# Patient Record
Sex: MALE | Race: WHITE | Employment: OTHER | URBAN - METROPOLITAN AREA
[De-identification: names, ages, dates, MRNs, and addresses within clinical notes are randomized per-mention and may not be internally consistent; named-entity substitution may affect disease eponyms.]

---

## 2017-01-31 ENCOUNTER — HOSPITAL ENCOUNTER (OUTPATIENT)
Dept: LAB | Age: 72
Discharge: HOME | End: 2017-01-31
Payer: MEDICARE

## 2017-01-31 PROCEDURE — 80053 COMPREHEN METABOLIC PANEL: CPT

## 2017-01-31 PROCEDURE — 36415 COLL VENOUS BLD VENIPUNCTURE: CPT

## 2017-01-31 PROCEDURE — 80061 LIPID PANEL: CPT

## 2017-02-07 ENCOUNTER — OFFICE VISIT (OUTPATIENT)
Dept: CARDIOLOGY CLINIC | Age: 72
End: 2017-02-07

## 2017-02-07 VITALS
HEART RATE: 72 BPM | OXYGEN SATURATION: 98 % | RESPIRATION RATE: 16 BRPM | BODY MASS INDEX: 25.51 KG/M2 | WEIGHT: 178.2 LBS | SYSTOLIC BLOOD PRESSURE: 110 MMHG | HEIGHT: 70 IN | DIASTOLIC BLOOD PRESSURE: 64 MMHG

## 2017-02-07 DIAGNOSIS — Z95.1 S/P CABG X 3: ICD-10-CM

## 2017-02-07 DIAGNOSIS — I10 ESSENTIAL HYPERTENSION: ICD-10-CM

## 2017-02-07 DIAGNOSIS — E78.2 MIXED HYPERLIPIDEMIA: ICD-10-CM

## 2017-02-07 DIAGNOSIS — I25.10 CORONARY ARTERY DISEASE INVOLVING NATIVE CORONARY ARTERY OF NATIVE HEART WITHOUT ANGINA PECTORIS: Primary | ICD-10-CM

## 2017-02-07 RX ORDER — TAMSULOSIN HYDROCHLORIDE 0.4 MG/1
0.4 CAPSULE ORAL DAILY
COMMUNITY
Start: 2017-01-12 | End: 2018-02-16

## 2017-02-07 NOTE — LETTER
7/28/2017 8:47 AM 
 
Mr. Sharon Dumont [address] [facility] [address] Dear Sharon Dumont: 
 
Please find your most recent results below. Resulted Orders LIPID PANEL Result Value Ref Range Cholesterol, total 105 100 - 199 mg/dL Triglyceride 73 0 - 149 mg/dL HDL Cholesterol 32 (L) >39 mg/dL VLDL, calculated 15 5 - 40 mg/dL LDL, calculated 58 0 - 99 mg/dL Narrative Performed at:  [address]  [id number] : Denice Springer MD, Phone:  [phone] METABOLIC PANEL, COMPREHENSIVE Result Value Ref Range Glucose 87 65 - 99 mg/dL BUN 23 8 - 27 mg/dL Creatinine 1.11 0.76 - 1.27 mg/dL GFR est non-AA 67 >59 mL/min/1.73 GFR est AA 78 >59 mL/min/1.73  
 BUN/Creatinine ratio 21 10 - 24 Sodium 144 134 - 144 mmol/L Potassium 4.6 3.5 - 5.2 mmol/L Chloride 103 96 - 106 mmol/L  
 CO2 24 18 - 29 mmol/L Calcium 9.3 8.6 - 10.2 mg/dL Protein, total 6.3 6.0 - 8.5 g/dL Albumin 4.1 3.6 - 4.8 g/dL GLOBULIN, TOTAL 2.2 1.5 - 4.5 g/dL A-G Ratio 1.9 1.2 - 2.2 Bilirubin, total 0.9 0.0 - 1.2 mg/dL Alk. phosphatase 60 39 - 117 IU/L  
 AST (SGOT) 15 0 - 40 IU/L  
 ALT (SGPT) 15 0 - 44 IU/L Narrative Performed at:  [address]  [id number] : Denice Springer MD, Phone:  [phone] CVD REPORT Result Value Ref Range INTERPRETATION Note Comment:  
   Supplement report is available. Narrative Performed at:  [address] [place] [address]  [id number] : Nella New PhD, Phone:  [phone] RECOMMENDATIONS: Labs look good. Stay on medications and repeat in 6 months Please call me if you have any questions: [phone] Sincerely, MD Robert Ramos, RN

## 2017-02-07 NOTE — PROGRESS NOTES
HISTORY OF PRESENT ILLNESS  Matteo Galicia is a 73 y.o. male     SUMMARY:   Problem List  Date Reviewed: 2/7/2017          Codes Class Noted    Postoperative anemia due to acute blood loss ICD-10-CM: D62  ICD-9-CM: 285.1  9/16/2016        S/P CABG x 3 ICD-10-CM: Z95.1  ICD-9-CM: V45.81  9/15/2016    Overview Signed 9/15/2016 11:20 AM by KATHYA Granados     CORONARY ARTERY BYPASS GRAFT x3, LIMA to LAD. RSVG to Dx. RSVG to PDA  Right endoscopic greater saphenous vein harvest               CAD (coronary artery disease) ICD-10-CM: I25.10  ICD-9-CM: 414.00  9/13/2016    Overview Signed 11/8/2016  7:37 AM by Candace Ortiz MD     9/16 stress echo positive with findings consistent with left main or multivessel disease             SOB (shortness of breath) ICD-10-CM: R06.02  ICD-9-CM: 786.05  8/31/2016        Precordial pain ICD-10-CM: R07.2  ICD-9-CM: 786.51  8/31/2016    Overview Signed 8/31/2016  4:43 PM by Jonathan Read MD     11/02 [facility], stress echo, ?distal lateral wall infarct with improved wall motion following exercise             HTN (hypertension) ICD-10-CM: I10  ICD-9-CM: 401.9  8/31/2016        Mixed hyperlipidemia ICD-10-CM: E78.2  ICD-9-CM: 272.2  8/31/2016              Current Outpatient Prescriptions on File Prior to Visit   Medication Sig    metoprolol succinate (TOPROL XL) 100 mg tablet Take 1 Tab by mouth daily.  psyllium (METAMUCIL) powd Take 3 Tabs by mouth daily.  oxyCODONE-acetaminophen (PERCOCET) 5-325 mg per tablet Take 1 Tab by mouth every six (6) hours as needed for Pain. Max Daily Amount: 4 Tabs.  nitroglycerin (NITROSTAT) 0.4 mg SL tablet 1 Tab by SubLINGual route every five (5) minutes as needed for Chest Pain (Max of 3 doses then call 911).  fluticasone (FLONASE) 50 mcg/actuation nasal spray 2 Sprays by Both Nostrils route as needed.  allopurinol (ZYLOPRIM) 300 mg tablet Take  by mouth daily.     fenofibrate nanocrystallized (TRICOR) 145 mg tablet Take  by mouth daily.  atorvastatin (LIPITOR) 20 mg tablet Take  by mouth daily.  potassium citrate (UROCIT-K10) 10 mEq (1,080 mg) TbER Take 10 mEq by mouth daily.  aspirin delayed-release 81 mg tablet Take  by mouth daily.  Bifidobacterium Infantis (ALIGN) 4 mg cap Take  by mouth.  cholecalciferol (VITAMIN D3) 1,000 unit cap Take  by mouth daily. No current facility-administered medications on file prior to visit. CARDIOLOGY STUDIES TO DATE:  9/16 stress echo positive with findings consistent with left main or multivessel disease         Chief Complaint   Patient presents with    Coronary Artery Disease     HPI :  Mr. Zaid Khan is doing great. He is still walking for about an hour a day with no worrisome symptoms, and he is not having any problems with his medications. We reviewed his recent lipid profile which looked great except for a low HDL which has been a chronic problem for him. He is having some mild orthostatic-type symptoms maybe once a week, but it turns out he admits he does not drink enough water, and we talked about that at length. CARDIAC ROS:   negative for chest pain, dyspnea, palpitations, syncope, orthopnea, paroxysmal nocturnal dyspnea, exertional chest pressure/discomfort, claudication, lower extremity edema    Family History   Problem Relation Age of Onset    Cancer Father      lung    Heart Disease Neg Hx        Past Medical History   Diagnosis Date    CAD (coronary artery disease) 09/15/2016     CABG x3    HTN (hypertension)     Hyperlipidemia     Kidney stones     Polio        GENERAL ROS:  A comprehensive review of systems was negative except for that written in the HPI.     Visit Vitals    /64 (BP 1 Location: Right arm, BP Patient Position: Sitting)    Pulse 72    Resp 16    Ht 5' 10\" (1.778 m)    Wt 178 lb 3.2 oz (80.8 kg)    SpO2 98%    BMI 25.57 kg/m2       Wt Readings from Last 3 Encounters:   02/07/17 178 lb 3.2 oz (80.8 kg) 11/08/16 187 lb (84.8 kg)   10/25/16 189 lb (85.7 kg)            BP Readings from Last 3 Encounters:   02/07/17 110/64   11/08/16 124/78   10/18/16 134/78       PHYSICAL EXAM  General appearance: alert, cooperative, no distress, appears stated age  Neck: supple, symmetrical, trachea midline, no adenopathy, no carotid bruit and no JVD  Lungs: clear to auscultation bilaterally  Heart: regular rate and rhythm, S1, S2 normal, no murmur, click, rub or gallop  Extremities: extremities normal, atraumatic, no cyanosis or edema    Lab Results   Component Value Date/Time    Cholesterol, total 116 01/31/2017 08:14 AM    HDL Cholesterol 27 01/31/2017 08:14 AM    LDL, calculated 67 01/31/2017 08:14 AM    Triglyceride 109 01/31/2017 08:14 AM     ASSESSMENT  Mr. Billy Mejia is stable, asymptomatic and well-compensated on a good medical regimen and needs no cardiac testing at this time. We gave him a lab slip for follow-up blood work. current treatment plan is effective, no change in therapy  lab results and schedule of future lab studies reviewed with patient  reviewed diet, exercise and weight control    Encounter Diagnoses   Name Primary?  Coronary artery disease involving native coronary artery of native heart without angina pectoris Yes    Essential hypertension     Mixed hyperlipidemia     S/P CABG x 3      Orders Placed This Encounter    tamsulosin (FLOMAX) 0.4 mg capsule       Follow-up Disposition:  Return in about 6 months (around 8/7/2017).     Tamar Almeida MD  2/7/2017

## 2017-02-07 NOTE — PROGRESS NOTES
Visit Vitals    /64 (BP 1 Location: Right arm, BP Patient Position: Sitting)    Pulse 72    Resp 16    Ht 5' 10\" (1.778 m)    Wt 178 lb 3.2 oz (80.8 kg)    SpO2 98%    BMI 25.57 kg/m2

## 2017-02-07 NOTE — MR AVS SNAPSHOT
Visit Information Date & Time Provider Department Dept. Phone Encounter #  
 2/7/2017  1:20 PM Deniz Kim MD [facility] [phone] [id number] Follow-up Instructions Return in about 6 months (around 8/7/2017). Your Appointments 2/7/2017  1:20 PM  
ESTABLISHED PATIENT with Deniz Kim MD  
[facility] ([address]) Appt Note: 3 month follow up  
 Dav[address] Roper [address] [facility] [place]  
[address] [facility] [phone] Upcoming Health Maintenance Date Due Hepatitis C Screening 1945 DTaP/Tdap/Td series (1 - Tdap) 11/11/1968 FOBT Q 1 YEAR AGE 50-75 11/11/1997 ZOSTER VACCINE AGE 60> 11/11/2007 GLAUCOMA SCREENING Q2Y 11/11/2012 MEDICARE YEARLY EXAM 11/11/2012 INFLUENZA AGE 9 TO ADULT 8/1/2016 Pneumococcal 65+ Low/Medium Risk (2 of 2 - PPSV23) 4/15/2020 Allergies as of 2/7/2017  Review Complete On: 2/7/2017 By: Deniz Kim MD  
  
 Severity Noted Reaction Type Reactions Flu Vacc 2012 (18+)c. deriv(pf)  08/25/2016    Unknown (comments) Current Immunizations  Reviewed on 10/25/2016 Name Date Pneumococcal Vaccine (Unspecified Type) 4/15/2015 Not reviewed this visit You Were Diagnosed With   
  
 Codes Comments Coronary artery disease involving native coronary artery of native heart without angina pectoris    -  Primary ICD-10-CM: I25.10 ICD-9-CM: 414.01 Essential hypertension     ICD-10-CM: I10 
ICD-9-CM: 401.9 Mixed hyperlipidemia     ICD-10-CM: E78.2 ICD-9-CM: 272.2 S/P CABG x 3     ICD-10-CM: Z95.1 ICD-9-CM: V45.81 Vitals BP Pulse Resp Height(growth percentile) Weight(growth percentile) SpO2  
 110/64 (BP 1 Location: Right arm, BP Patient Position: Sitting) 72 16 5' 10\" (1.778 m) 178 lb 3.2 oz (80.8 kg) 98% BMI Smoking Status 25.57 kg/m2 Never Smoker BMI and BSA Data Body Mass Index Body Surface Area 25.57 kg/m 2 2 m 2 Preferred Pharmacy Pharmacy Name Phone [address] [facility] [phone] Your Updated Medication List  
  
   
This list is accurate as of: 2/7/17  1:12 PM.  Always use your most recent med list.  
  
  
  
  
 ALIGN 4 mg Cap Generic drug:  Bifidobacterium Infantis Take  by mouth. allopurinol 300 mg tablet Commonly known as:  Kyle Night Take  by mouth daily. aspirin delayed-release 81 mg tablet Take  by mouth daily. fluticasone 50 mcg/actuation nasal spray Commonly known as:  Rodriguez Blizzard 2 Sprays by Both Nostrils route as needed. LIPITOR 20 mg tablet Generic drug:  atorvastatin Take  by mouth daily. metoprolol succinate 100 mg tablet Commonly known as:  TOPROL XL Take 1 Tab by mouth daily. nitroglycerin 0.4 mg SL tablet Commonly known as:  NITROSTAT  
1 Tab by SubLINGual route every five (5) minutes as needed for Chest Pain (Max of 3 doses then call 911). oxyCODONE-acetaminophen 5-325 mg per tablet Commonly known as:  PERCOCET Take 1 Tab by mouth every six (6) hours as needed for Pain. Max Daily Amount: 4 Tabs. potassium citrate 10 mEq (1,080 mg) Floyd Redder Commonly known as:  [place] Take 10 mEq by mouth daily. psyllium Powd Commonly known as:  METAMUCIL Take 3 Tabs by mouth daily. tamsulosin 0.4 mg capsule Commonly known as:  FLOMAX Take 0.4 mg by mouth daily. TRICOR 145 mg tablet Generic drug:  fenofibrate nanocrystallized Take  by mouth daily. VITAMIN D3 1,000 unit Cap Generic drug:  cholecalciferol Take  by mouth daily. Follow-up Instructions Return in about 6 months (around 8/7/2017). Please provide this summary of care documentation to your next provider. Your primary care clinician is listed as Nelson Knutson III. If you have any questions after today's visit, please call [phone].

## 2017-07-26 ENCOUNTER — HOSPITAL ENCOUNTER (OUTPATIENT)
Dept: LAB | Age: 72
Discharge: HOME | End: 2017-07-26
Payer: MEDICARE

## 2017-07-26 PROCEDURE — 80061 LIPID PANEL: CPT

## 2017-07-26 PROCEDURE — 80053 COMPREHEN METABOLIC PANEL: CPT

## 2017-07-26 PROCEDURE — 36415 COLL VENOUS BLD VENIPUNCTURE: CPT

## 2017-07-27 LAB
ALBUMIN SERPL-MCNC: 4.1 G/DL (ref 3.6–4.8)
ALBUMIN/GLOB SERPL: 1.9 {RATIO} (ref 1.2–2.2)
ALP SERPL-CCNC: 60 IU/L (ref 39–117)
ALT SERPL-CCNC: 15 IU/L (ref 0–44)
AST SERPL-CCNC: 15 IU/L (ref 0–40)
BILIRUB SERPL-MCNC: 0.9 MG/DL (ref 0–1.2)
BUN SERPL-MCNC: 23 MG/DL (ref 8–27)
BUN/CREAT SERPL: 21 (ref 10–24)
CALCIUM SERPL-MCNC: 9.3 MG/DL (ref 8.6–10.2)
CHLORIDE SERPL-SCNC: 103 MMOL/L (ref 96–106)
CHOLEST SERPL-MCNC: 105 MG/DL (ref 100–199)
CO2 SERPL-SCNC: 24 MMOL/L (ref 18–29)
CREAT SERPL-MCNC: 1.11 MG/DL (ref 0.76–1.27)
GLOBULIN SER CALC-MCNC: 2.2 G/DL (ref 1.5–4.5)
GLUCOSE SERPL-MCNC: 87 MG/DL (ref 65–99)
HDLC SERPL-MCNC: 32 MG/DL
INTERPRETATION, 910389: NORMAL
LDLC SERPL CALC-MCNC: 58 MG/DL (ref 0–99)
POTASSIUM SERPL-SCNC: 4.6 MMOL/L (ref 3.5–5.2)
PROT SERPL-MCNC: 6.3 G/DL (ref 6–8.5)
SODIUM SERPL-SCNC: 144 MMOL/L (ref 134–144)
TRIGL SERPL-MCNC: 73 MG/DL (ref 0–149)
VLDLC SERPL CALC-MCNC: 15 MG/DL (ref 5–40)

## 2017-08-04 ENCOUNTER — OFFICE VISIT (OUTPATIENT)
Dept: CARDIOLOGY CLINIC | Age: 72
End: 2017-08-04

## 2017-08-04 VITALS
BODY MASS INDEX: 24.88 KG/M2 | HEIGHT: 70 IN | SYSTOLIC BLOOD PRESSURE: 108 MMHG | HEART RATE: 60 BPM | DIASTOLIC BLOOD PRESSURE: 68 MMHG | WEIGHT: 173.8 LBS

## 2017-08-04 DIAGNOSIS — Z95.1 S/P CABG X 3: ICD-10-CM

## 2017-08-04 DIAGNOSIS — E78.2 MIXED HYPERLIPIDEMIA: ICD-10-CM

## 2017-08-04 DIAGNOSIS — I10 ESSENTIAL HYPERTENSION: ICD-10-CM

## 2017-08-04 DIAGNOSIS — I25.10 CORONARY ARTERY DISEASE INVOLVING NATIVE CORONARY ARTERY OF NATIVE HEART WITHOUT ANGINA PECTORIS: Primary | ICD-10-CM

## 2017-08-04 NOTE — MR AVS SNAPSHOT
Visit Information Date & Time Provider Department Dept. Phone Encounter #  
 8/4/2017 11:20 AM Oscar Walker MD [facility] Daiana Maher [phone] [id number] Upcoming Health Maintenance Date Due Hepatitis C Screening 1945 DTaP/Tdap/Td series (1 - Tdap) 11/11/1968 FOBT Q 1 YEAR AGE 50-75 11/11/1997 ZOSTER VACCINE AGE 60> 9/11/2007 GLAUCOMA SCREENING Q2Y 11/11/2012 MEDICARE YEARLY EXAM 11/11/2012 INFLUENZA AGE 9 TO ADULT 8/1/2017 Pneumococcal 65+ Low/Medium Risk (2 of 2 - PPSV23) 4/15/2020 Allergies as of 8/4/2017  Review Complete On: 8/4/2017 By: Oscar Walker MD  
  
 Severity Noted Reaction Type Reactions Flu Vacc 2012 (18+)c. deriv(pf)  08/25/2016    Unknown (comments) Current Immunizations  Reviewed on 10/25/2016 Name Date Pneumococcal Vaccine (Unspecified Type) 4/15/2015 Not reviewed this visit You Were Diagnosed With   
  
 Codes Comments Coronary artery disease involving native coronary artery of native heart without angina pectoris    -  Primary ICD-10-CM: I25.10 ICD-9-CM: 414.01 Essential hypertension     ICD-10-CM: I10 
ICD-9-CM: 401.9 Mixed hyperlipidemia     ICD-10-CM: E78.2 ICD-9-CM: 272.2 S/P CABG x 3     ICD-10-CM: Z95.1 ICD-9-CM: V45.81 Vitals BP Pulse Height(growth percentile) Weight(growth percentile) BMI Smoking Status 108/68 60 5' 10\" (1.778 m) 173 lb 12.8 oz (78.8 kg) 24.94 kg/m2 Never Smoker Vitals History BMI and BSA Data Body Mass Index Body Surface Area 24.94 kg/m 2 1.97 m 2 Preferred Pharmacy Pharmacy Name Phone [facility] [phone] Your Updated Medication List  
  
   
This list is accurate as of: 8/4/17 11:42 AM.  Always use your most recent med list.  
  
  
  
  
 ALIGN 4 mg Cap Generic drug:  Bifidobacterium Infantis Take  by mouth. allopurinol 300 mg tablet Commonly known as:  Bob Win Take  by mouth daily. aspirin delayed-release 81 mg tablet Take  by mouth daily. fluticasone 50 mcg/actuation nasal spray Commonly known as:  Mita Debby 2 Sprays by Both Nostrils route as needed. LIPITOR 20 mg tablet Generic drug:  atorvastatin Take  by mouth daily. metoprolol succinate 100 mg tablet Commonly known as:  TOPROL XL Take 1 Tab by mouth daily. nitroglycerin 0.4 mg SL tablet Commonly known as:  NITROSTAT  
1 Tab by SubLINGual route every five (5) minutes as needed for Chest Pain (Max of 3 doses then call 911). oxyCODONE-acetaminophen 5-325 mg per tablet Commonly known as:  PERCOCET Take 1 Tab by mouth every six (6) hours as needed for Pain. Max Daily Amount: 4 Tabs. potassium citrate 10 mEq (1,080 mg) Tripp Gaetano Commonly known as:  [place] Take 10 mEq by mouth daily. psyllium Powd Commonly known as:  METAMUCIL Take 3 Tabs by mouth daily. tamsulosin 0.4 mg capsule Commonly known as:  FLOMAX Take 0.4 mg by mouth daily. TRICOR 145 mg tablet Generic drug:  fenofibrate nanocrystallized Take  by mouth daily. VITAMIN D3 1,000 unit Cap Generic drug:  cholecalciferol Take  by mouth daily. Please provide this summary of care documentation to your next provider. Your primary care clinician is listed as Tremayneora Kavitha III. If you have any questions after today's visit, please call [phone].

## 2017-08-04 NOTE — PROGRESS NOTES
HISTORY OF PRESENT ILLNESS  Fany Rios is a 73 y.o. male     SUMMARY:   Problem List  Date Reviewed: 8/4/2017          Codes Class Noted    Postoperative anemia due to acute blood loss ICD-10-CM: D62  ICD-9-CM: 285.1  9/16/2016        S/P CABG x 3 ICD-10-CM: Z95.1  ICD-9-CM: V45.81  9/15/2016    Overview Signed 9/15/2016 11:20 AM by Mita Gooden PA     CORONARY ARTERY BYPASS GRAFT x3, LIMA to LAD. RSVG to Dx. RSVG to PDA  Right endoscopic greater saphenous vein harvest               CAD (coronary artery disease) ICD-10-CM: I25.10  ICD-9-CM: 414.00  9/13/2016    Overview Signed 11/8/2016  7:37 AM by Naya Cordova MD     9/16 stress echo positive with findings consistent with left main or multivessel disease             SOB (shortness of breath) ICD-10-CM: R06.02  ICD-9-CM: 786.05  8/31/2016        Precordial pain ICD-10-CM: R07.2  ICD-9-CM: 786.51  8/31/2016    Overview Signed 8/31/2016  4:43 PM by Nora Colmenares MD     11/02 [facility], stress echo, ?distal lateral wall infarct with improved wall motion following exercise             HTN (hypertension) ICD-10-CM: I10  ICD-9-CM: 401.9  8/31/2016        Mixed hyperlipidemia ICD-10-CM: N90.2  ICD-9-CM: 272.2  8/31/2016              Current Outpatient Prescriptions on File Prior to Visit   Medication Sig    tamsulosin (FLOMAX) 0.4 mg capsule Take 0.4 mg by mouth daily.  metoprolol succinate (TOPROL XL) 100 mg tablet Take 1 Tab by mouth daily.  psyllium (METAMUCIL) powd Take 3 Tabs by mouth daily.  oxyCODONE-acetaminophen (PERCOCET) 5-325 mg per tablet Take 1 Tab by mouth every six (6) hours as needed for Pain. Max Daily Amount: 4 Tabs.  nitroglycerin (NITROSTAT) 0.4 mg SL tablet 1 Tab by SubLINGual route every five (5) minutes as needed for Chest Pain (Max of 3 doses then call 911).  fluticasone (FLONASE) 50 mcg/actuation nasal spray 2 Sprays by Both Nostrils route as needed.     allopurinol (ZYLOPRIM) 300 mg tablet Take  by mouth daily.  fenofibrate nanocrystallized (TRICOR) 145 mg tablet Take  by mouth daily.  atorvastatin (LIPITOR) 20 mg tablet Take  by mouth daily.  potassium citrate (UROCIT-K10) 10 mEq (1,080 mg) TbER Take 10 mEq by mouth daily.  aspirin delayed-release 81 mg tablet Take  by mouth daily.  cholecalciferol (VITAMIN D3) 1,000 unit cap Take  by mouth daily.  Bifidobacterium Infantis (ALIGN) 4 mg cap Take  by mouth. No current facility-administered medications on file prior to visit. CARDIOLOGY STUDIES TO DATE:  9/16 stress echo positive with findings consistent with left main or multivessel disease        Chief Complaint   Patient presents with    Coronary Artery Disease     HPI :  Mr. Tiffanie Boyer is doing great. He is still walking every day, and he has lost another five pounds since we last met. No problems with his medications, and we reviewed his lipid profile from July which looked outstanding except for a low HDL. CARDIAC ROS:   negative for chest pain, dyspnea, palpitations, syncope, orthopnea, paroxysmal nocturnal dyspnea, exertional chest pressure/discomfort, claudication, lower extremity edema    Family History   Problem Relation Age of Onset    Cancer Father      lung    Heart Disease Neg Hx        Past Medical History:   Diagnosis Date    CAD (coronary artery disease) 09/15/2016    CABG x3    HTN (hypertension)     Hyperlipidemia     Kidney stones     Polio        GENERAL ROS:  A comprehensive review of systems was negative except for that written in the HPI.     Visit Vitals    /68    Pulse 60    Ht 5' 10\" (1.778 m)    Wt 173 lb 12.8 oz (78.8 kg)    BMI 24.94 kg/m2       Wt Readings from Last 3 Encounters:   08/04/17 173 lb 12.8 oz (78.8 kg)   02/07/17 178 lb 3.2 oz (80.8 kg)   11/08/16 187 lb (84.8 kg)            BP Readings from Last 3 Encounters:   08/04/17 108/68   02/07/17 110/64   11/08/16 124/78       PHYSICAL EXAM  General appearance: alert, cooperative, no distress, appears stated age  Neck: supple, symmetrical, trachea midline, no adenopathy, no carotid bruit and no JVD  Lungs: clear to auscultation bilaterally  Heart: regular rate and rhythm, S1, S2 normal, no murmur, click, rub or gallop  Extremities: extremities normal, atraumatic, no cyanosis or edema    Lab Results   Component Value Date/Time    Cholesterol, total 105 07/26/2017 07:54 AM    Cholesterol, total 116 01/31/2017 08:14 AM    HDL Cholesterol 32 07/26/2017 07:54 AM    HDL Cholesterol 27 01/31/2017 08:14 AM    LDL, calculated 58 07/26/2017 07:54 AM    LDL, calculated 67 01/31/2017 08:14 AM    Triglyceride 73 07/26/2017 07:54 AM    Triglyceride 109 01/31/2017 08:14 AM     ASSESSMENT  Mr. Hiram Cameron is stable, asymptomatic and well-compensated on a good medical regimen and needs no cardiac testing at this time. He has a lab slip for follow-up blood work. current treatment plan is effective, no change in therapy  lab results and schedule of future lab studies reviewed with patient  reviewed diet, exercise and weight control    Encounter Diagnoses   Name Primary?  Coronary artery disease involving native coronary artery of native heart without angina pectoris Yes    Essential hypertension     Mixed hyperlipidemia     S/P CABG x 3      No orders of the defined types were placed in this encounter. Follow-up Disposition:  Return in about 6 months (around 2/4/2018).     Hossein Schulz MD  8/4/2017

## 2018-02-01 ENCOUNTER — HOSPITAL ENCOUNTER (OUTPATIENT)
Dept: LAB | Age: 73
Discharge: HOME | End: 2018-02-01
Payer: MEDICARE

## 2018-02-01 PROCEDURE — 80061 LIPID PANEL: CPT

## 2018-02-01 PROCEDURE — 36415 COLL VENOUS BLD VENIPUNCTURE: CPT

## 2018-02-01 PROCEDURE — 80053 COMPREHEN METABOLIC PANEL: CPT

## 2018-02-01 NOTE — LETTER
2/2/2018 9:12 AM 
 
Mr. Placido Condon [address] [facility] [address] Dear Placido oCndon: 
 
Please find your most recent results below. Resulted Orders METABOLIC PANEL, COMPREHENSIVE Result Value Ref Range Glucose 92 65 - 99 mg/dL BUN 27 8 - 27 mg/dL Creatinine 1.30 (H) 0.76 - 1.27 mg/dL GFR est non-AA 55 (L) >59 mL/min/1.73 GFR est AA 64 >59 mL/min/1.73  
 BUN/Creatinine ratio 21 10 - 24 Sodium 142 134 - 144 mmol/L Potassium 4.9 3.5 - 5.2 mmol/L Chloride 101 96 - 106 mmol/L  
 CO2 27 18 - 29 mmol/L Calcium 9.5 8.6 - 10.2 mg/dL Protein, total 6.6 6.0 - 8.5 g/dL Albumin 4.3 3.5 - 4.8 g/dL GLOBULIN, TOTAL 2.3 1.5 - 4.5 g/dL A-G Ratio 1.9 1.2 - 2.2 Bilirubin, total 1.0 0.0 - 1.2 mg/dL Alk. phosphatase 58 39 - 117 IU/L  
 AST (SGOT) 20 0 - 40 IU/L  
 ALT (SGPT) 15 0 - 44 IU/L Narrative Performed at:  [address]  [id number] : Siddharth Pizarro MD, Phone:  [phone] LIPID PANEL Result Value Ref Range Cholesterol, total 115 100 - 199 mg/dL Triglyceride 111 0 - 149 mg/dL HDL Cholesterol 29 (L) >39 mg/dL VLDL, calculated 22 5 - 40 mg/dL LDL, calculated 64 0 - 99 mg/dL Narrative Performed at:  [address]  [id number] : Siddharth Pizarro MD, Phone:  [phone] CVD REPORT Result Value Ref Range INTERPRETATION Note Comment:  
   Supplemental report is available. PDF IMAGE Not applicable Narrative Performed at:  [address] [address] [place], [place]  [id number] : Meredith Maldonado PhD, Phone:  [phone] CKD REPORT Result Value Ref Range Interpretation Note Comment:  
   Supplemental report is available. Narrative Performed at:  [address] [place] [address]  [id number] : Meredith Maldonado PhD, Phone:  [phone] RECOMMENDATIONS: 
Cholesterol is good. Kidney function is slightly off but ok for now. Stay on current medications and repeat in 6 months. A copy has been sent to your PCP. Please call me if you have any questions: [phone] Sincerely, Teddy Corey MD

## 2018-02-02 ENCOUNTER — TELEPHONE (OUTPATIENT)
Dept: CARDIOLOGY CLINIC | Age: 73
End: 2018-02-02

## 2018-02-02 DIAGNOSIS — E78.2 MIXED HYPERLIPIDEMIA: Primary | ICD-10-CM

## 2018-02-02 LAB
ALBUMIN SERPL-MCNC: 4.3 G/DL (ref 3.5–4.8)
ALBUMIN/GLOB SERPL: 1.9 {RATIO} (ref 1.2–2.2)
ALP SERPL-CCNC: 58 IU/L (ref 39–117)
ALT SERPL-CCNC: 15 IU/L (ref 0–44)
AST SERPL-CCNC: 20 IU/L (ref 0–40)
BILIRUB SERPL-MCNC: 1 MG/DL (ref 0–1.2)
BUN SERPL-MCNC: 27 MG/DL (ref 8–27)
BUN/CREAT SERPL: 21 (ref 10–24)
CALCIUM SERPL-MCNC: 9.5 MG/DL (ref 8.6–10.2)
CHLORIDE SERPL-SCNC: 101 MMOL/L (ref 96–106)
CHOLEST SERPL-MCNC: 115 MG/DL (ref 100–199)
CO2 SERPL-SCNC: 27 MMOL/L (ref 18–29)
CREAT SERPL-MCNC: 1.3 MG/DL (ref 0.76–1.27)
GFR SERPLBLD CREATININE-BSD FMLA CKD-EPI: 55 ML/MIN/1.73
GFR SERPLBLD CREATININE-BSD FMLA CKD-EPI: 64 ML/MIN/1.73
GLOBULIN SER CALC-MCNC: 2.3 G/DL (ref 1.5–4.5)
GLUCOSE SERPL-MCNC: 92 MG/DL (ref 65–99)
HDLC SERPL-MCNC: 29 MG/DL
INTERPRETATION, 910389: NORMAL
INTERPRETATION: NORMAL
LDLC SERPL CALC-MCNC: 64 MG/DL (ref 0–99)
PDF IMAGE, 910387: NORMAL
POTASSIUM SERPL-SCNC: 4.9 MMOL/L (ref 3.5–5.2)
PROT SERPL-MCNC: 6.6 G/DL (ref 6–8.5)
SODIUM SERPL-SCNC: 142 MMOL/L (ref 134–144)
TRIGL SERPL-MCNC: 111 MG/DL (ref 0–149)
VLDLC SERPL CALC-MCNC: 22 MG/DL (ref 5–40)

## 2018-02-02 NOTE — PROGRESS NOTES
Cholesterol is good. Kidney function slightly off but ok for now. Stay on meds and repeat labs 6mos. Will discuss further at fup.  Copy to pcp

## 2018-02-02 NOTE — TELEPHONE ENCOUNTER
----- Message from Stephanie Bailon MD sent at 2/2/2018  7:29 AM EST -----  Cholesterol is good. Kidney function slightly off but ok for now. Stay on meds and repeat labs 6mos. Will discuss further at fup.  Copy to pcp

## 2018-02-16 ENCOUNTER — OFFICE VISIT (OUTPATIENT)
Dept: CARDIOLOGY CLINIC | Age: 73
End: 2018-02-16

## 2018-02-16 VITALS
HEART RATE: 62 BPM | SYSTOLIC BLOOD PRESSURE: 100 MMHG | OXYGEN SATURATION: 98 % | BODY MASS INDEX: 26.14 KG/M2 | DIASTOLIC BLOOD PRESSURE: 60 MMHG | RESPIRATION RATE: 16 BRPM | WEIGHT: 182.6 LBS | HEIGHT: 70 IN

## 2018-02-16 DIAGNOSIS — Z95.1 S/P CABG X 3: ICD-10-CM

## 2018-02-16 DIAGNOSIS — E78.2 MIXED HYPERLIPIDEMIA: ICD-10-CM

## 2018-02-16 DIAGNOSIS — R06.02 SOB (SHORTNESS OF BREATH): ICD-10-CM

## 2018-02-16 DIAGNOSIS — I25.10 CORONARY ARTERY DISEASE INVOLVING NATIVE CORONARY ARTERY OF NATIVE HEART WITHOUT ANGINA PECTORIS: Primary | ICD-10-CM

## 2018-02-16 DIAGNOSIS — I10 ESSENTIAL HYPERTENSION: ICD-10-CM

## 2018-02-16 RX ORDER — NITROGLYCERIN 0.4 MG/1
0.4 TABLET SUBLINGUAL
Qty: 1 BOTTLE | Refills: 1 | Status: SHIPPED | OUTPATIENT
Start: 2018-02-16 | End: 2019-08-29 | Stop reason: SDUPTHER

## 2018-02-16 RX ORDER — POLYETHYLENE GLYCOL 3350 17 G/17G
17 POWDER, FOR SOLUTION ORAL AS NEEDED
COMMUNITY
End: 2022-06-29 | Stop reason: ALTCHOICE

## 2018-02-16 NOTE — MR AVS SNAPSHOT
[address] [facility] [address] [place] 
[phone] Patient: Jeanne Rogel MRN: [id number] Visit Information Date & Time Provider Department Dept. Phone Encounter #  
 2/16/2018 10:40 AM Narda De León MD [facility] [phone] [id number] Follow-up Instructions Return in about 6 months (around 8/16/2018). Your Appointments 8/17/2018  8:20 AM  
ESTABLISHED PATIENT with Narda De León MD  
[facility] ([facility]) Appt Note: 6 mo f/u  
 [address] [facility] [address] [place]  
[address] [facility] 7 [address] Upcoming Health Maintenance Date Due Hepatitis C Screening 1945 DTaP/Tdap/Td series (1 - Tdap) 11/11/1968 FOBT Q 1 YEAR AGE 50-75 11/11/1997 ZOSTER VACCINE AGE 60> 9/11/2007 GLAUCOMA SCREENING Q2Y 11/11/2012 MEDICARE YEARLY EXAM 11/11/2012 Influenza Age 5 to Adult 8/1/2017 Pneumococcal 65+ Low/Medium Risk (2 of 2 - PPSV23) 4/15/2020 Allergies as of 2/16/2018  Review Complete On: 2/16/2018 By: Narda De León MD  
  
 Severity Noted Reaction Type Reactions Flu Vacc 2012 (18+)c. deriv(pf)  08/25/2016    Unknown (comments) Current Immunizations  Reviewed on 10/25/2016 Name Date Pneumococcal Vaccine (Unspecified Type) 4/15/2015 Not reviewed this visit You Were Diagnosed With   
  
 Codes Comments Coronary artery disease involving native coronary artery of native heart without angina pectoris    -  Primary ICD-10-CM: I25.10 ICD-9-CM: 414.01 Essential hypertension     ICD-10-CM: I10 
ICD-9-CM: 401.9 Mixed hyperlipidemia     ICD-10-CM: E78.2 ICD-9-CM: 272.2 S/P CABG x 3     ICD-10-CM: Z95.1 ICD-9-CM: V45.81   
 SOB (shortness of breath)     ICD-10-CM: R06.02 
ICD-9-CM: 786.05 Vitals BP Pulse Resp Height(growth percentile) Weight(growth percentile) SpO2  
 100/60 (BP 1 Location: Left arm, BP Patient Position: Sitting) 62 16 5' 10\" (1.778 m) 182 lb 9.6 oz (82.8 kg) 98% BMI Smoking Status 26.2 kg/m2 Never Smoker BMI and BSA Data Body Mass Index Body Surface Area  
 26.2 kg/m 2 2.02 m 2 Preferred Pharmacy Pharmacy Name Phone [address] [facility] [phone] Your Updated Medication List  
  
   
This list is accurate as of: 2/16/18 11:04 AM.  Always use your most recent med list.  
  
  
  
  
 allopurinol 300 mg tablet Commonly known as:  Rhodia Bannister Take  by mouth daily. aspirin delayed-release 81 mg tablet Take  by mouth daily. fluticasone 50 mcg/actuation nasal spray Commonly known as:  Alfrieda Pillar 2 Sprays by Both Nostrils route as needed. LIPITOR 20 mg tablet Generic drug:  atorvastatin Take  by mouth daily. metoprolol succinate 100 mg tablet Commonly known as:  TOPROL XL Take 1 Tab by mouth daily. MIRALAX 17 gram/dose powder Generic drug:  polyethylene glycol Take 17 g by mouth daily. nitroglycerin 0.4 mg SL tablet Commonly known as:  NITROSTAT  
1 Tab by SubLINGual route every five (5) minutes as needed for Chest Pain (Max of 3 doses then call 911). oxyCODONE-acetaminophen 5-325 mg per tablet Commonly known as:  PERCOCET Take 1 Tab by mouth every six (6) hours as needed for Pain. Max Daily Amount: 4 Tabs. potassium citrate 10 mEq (1,080 mg) Graydon Husbands Commonly known as:  [place] Take 10 mEq by mouth daily. TRICOR 145 mg tablet Generic drug:  fenofibrate nanocrystallized Take  by mouth daily. VITAMIN D3 1,000 unit Cap Generic drug:  cholecalciferol Take  by mouth daily. Prescriptions Sent to Pharmacy  Refills  
 nitroglycerin (NITROSTAT) 0.4 mg SL tablet 1  
 Si Tab by SubLINGual route every five (5) minutes as needed for Chest Pain (Max of 3 doses then call 911). Class: Normal  
 Pharmacy: [address], [address] [place] [address] #: [phone] Route: SubLINGual  
  
We Performed the Following LIPID PANEL [02108 CPT(R)] METABOLIC PANEL, COMPREHENSIVE [46840 CPT(R)] Follow-up Instructions Return in about 6 months (around 2018). Introducing [facility] & [facility] SERVICES! Dear Geoff Cole: Thank you for requesting a Love[employer] account. Our records indicate that you have previously registered for a LoveThis account but its currently inactive. Please call our [employer] support line at [phone]. Additional Information If you have questions, please visit the Frequently Asked Questions section of the Love[employer] website at [email or web address] Remember, LoveThis is NOT to be used for urgent needs. For medical emergencies, dial 911. Now available from your iPhone and Android! Please provide this summary of care documentation to your next provider. Your primary care clinician is listed as Shelli Powers III. If you have any questions after today's visit, please call [phone].

## 2018-02-16 NOTE — PROGRESS NOTES
HISTORY OF PRESENT ILLNESS  Ángela Lama is a 81 y.o. male     SUMMARY:   Problem List  Date Reviewed: 2/16/2018          Codes Class Noted    Postoperative anemia due to acute blood loss ICD-10-CM: D62  ICD-9-CM: 285.1  9/16/2016        S/P CABG x 3 ICD-10-CM: Z95.1  ICD-9-CM: V45.81  9/15/2016    Overview Signed 9/15/2016 11:20 AM by KATHYA Rodriguez     CORONARY ARTERY BYPASS GRAFT x3, LIMA to LAD. RSVG to Dx. RSVG to PDA  Right endoscopic greater saphenous vein harvest               CAD (coronary artery disease) ICD-10-CM: I25.10  ICD-9-CM: 414.00  9/13/2016    Overview Signed 11/8/2016  7:37 AM by Rosendo Coon MD     9/16 stress echo positive with findings consistent with left main or multivessel disease             SOB (shortness of breath) ICD-10-CM: R06.02  ICD-9-CM: 786.05  8/31/2016        Precordial pain ICD-10-CM: R07.2  ICD-9-CM: 786.51  8/31/2016    Overview Signed 8/31/2016  4:43 PM by Simran Hicks MD     11/02 [facility], stress echo, ?distal lateral wall infarct with improved wall motion following exercise             HTN (hypertension) ICD-10-CM: I10  ICD-9-CM: 401.9  8/31/2016        Mixed hyperlipidemia ICD-10-CM: E78.2  ICD-9-CM: 272.2  8/31/2016              Current Outpatient Prescriptions on File Prior to Visit   Medication Sig    metoprolol succinate (TOPROL XL) 100 mg tablet Take 1 Tab by mouth daily.  oxyCODONE-acetaminophen (PERCOCET) 5-325 mg per tablet Take 1 Tab by mouth every six (6) hours as needed for Pain. Max Daily Amount: 4 Tabs.  nitroglycerin (NITROSTAT) 0.4 mg SL tablet 1 Tab by SubLINGual route every five (5) minutes as needed for Chest Pain (Max of 3 doses then call 911).  fluticasone (FLONASE) 50 mcg/actuation nasal spray 2 Sprays by Both Nostrils route as needed.  allopurinol (ZYLOPRIM) 300 mg tablet Take  by mouth daily.  fenofibrate nanocrystallized (TRICOR) 145 mg tablet Take  by mouth daily.     atorvastatin (LIPITOR) 20 mg tablet Take  by mouth daily.  potassium citrate (UROCIT-K10) 10 mEq (1,080 mg) TbER Take 10 mEq by mouth daily.  aspirin delayed-release 81 mg tablet Take  by mouth daily.  cholecalciferol (VITAMIN D3) 1,000 unit cap Take  by mouth daily. No current facility-administered medications on file prior to visit. CARDIOLOGY STUDIES TO DATE:  9/16 stress echo positive with findings consistent with left main or multivessel disease         Chief Complaint   Patient presents with    Coronary Artery Disease     HPI :  Mr. Thalia Fried is doing really well. He continues to walk regularly without any worrisome symptoms and he is having no problems with his medications. He is planning a trip to [facility] to [facility] in the near future. Recent lipid profile looked great, except for a low HDL and his creatinine was up a bit. It turns out he is having a lot of urinary frequency, especially during the day and also some dribbling. CARDIAC ROS:   negative for chest pain, dyspnea, palpitations, syncope, orthopnea, paroxysmal nocturnal dyspnea, exertional chest pressure/discomfort, claudication, lower extremity edema    Family History   Problem Relation Age of Onset    Cancer Father      lung    Heart Disease Neg Hx        Past Medical History:   Diagnosis Date    CAD (coronary artery disease) 09/15/2016    CABG x3    HTN (hypertension)     Hyperlipidemia     Kidney stones     Polio        GENERAL ROS:  A comprehensive review of systems was negative except for that written in the HPI.     Visit Vitals    /60 (BP 1 Location: Left arm, BP Patient Position: Sitting)    Pulse 62    Resp 16    Ht 5' 10\" (1.778 m)    Wt 182 lb 9.6 oz (82.8 kg)    SpO2 98%    BMI 26.2 kg/m2       Wt Readings from Last 3 Encounters:   02/16/18 182 lb 9.6 oz (82.8 kg)   08/04/17 173 lb 12.8 oz (78.8 kg)   02/07/17 178 lb 3.2 oz (80.8 kg)            BP Readings from Last 3 Encounters:   02/16/18 100/60   08/04/17 108/68 02/07/17 110/64       PHYSICAL EXAM  General appearance: alert, cooperative, no distress, appears stated age  Neck: supple, symmetrical, trachea midline, no adenopathy, no carotid bruit and no JVD  Lungs: clear to auscultation bilaterally  Heart: regular rate and rhythm, S1, S2 normal, no murmur, click, rub or gallop  Extremities: extremities normal, atraumatic, no cyanosis or edema    Lab Results   Component Value Date/Time    Cholesterol, total 115 02/01/2018 08:22 AM    Cholesterol, total 105 07/26/2017 07:54 AM    Cholesterol, total 116 01/31/2017 08:14 AM    HDL Cholesterol 29 (L) 02/01/2018 08:22 AM    HDL Cholesterol 32 (L) 07/26/2017 07:54 AM    HDL Cholesterol 27 (L) 01/31/2017 08:14 AM    LDL, calculated 64 02/01/2018 08:22 AM    LDL, calculated 58 07/26/2017 07:54 AM    LDL, calculated 67 01/31/2017 08:14 AM    Triglyceride 111 02/01/2018 08:22 AM    Triglyceride 73 07/26/2017 07:54 AM    Triglyceride 109 01/31/2017 08:14 AM     ASSESSMENT  Mr. Leandro Benedict is stable, asymptomatic and well compensated on a good medical regimen and needs no cardiac testing at this time. We gave him a lab slip for follow-up blood work. He is going to call his urologist and make an appointment to see him and take a copy of his labs. current treatment plan is effective, no change in therapy  lab results and schedule of future lab studies reviewed with patient  reviewed diet, exercise and weight control    Encounter Diagnoses   Name Primary?  Coronary artery disease involving native coronary artery of native heart without angina pectoris Yes    Essential hypertension     Mixed hyperlipidemia     S/P CABG x 3      Orders Placed This Encounter    polyethylene glycol (MIRALAX) 17 gram/dose powder       Follow-up Disposition:  Return in about 6 months (around 8/16/2018).     Hamzah Nunez MD  2/16/2018

## 2018-08-16 DIAGNOSIS — I25.10 CORONARY ARTERY DISEASE INVOLVING NATIVE CORONARY ARTERY OF NATIVE HEART WITHOUT ANGINA PECTORIS: Primary | ICD-10-CM

## 2018-08-16 DIAGNOSIS — E78.2 MIXED HYPERLIPIDEMIA: ICD-10-CM

## 2018-08-16 LAB
ALBUMIN SERPL-MCNC: 4.5 G/DL (ref 3.5–4.8)
ALBUMIN/GLOB SERPL: 2 {RATIO} (ref 1.2–2.2)
ALP SERPL-CCNC: 65 IU/L (ref 39–117)
ALT SERPL-CCNC: 14 IU/L (ref 0–44)
AST SERPL-CCNC: 21 IU/L (ref 0–40)
BILIRUB SERPL-MCNC: 1.1 MG/DL (ref 0–1.2)
BUN SERPL-MCNC: 22 MG/DL (ref 8–27)
BUN/CREAT SERPL: 18 (ref 10–24)
CALCIUM SERPL-MCNC: 9.1 MG/DL (ref 8.6–10.2)
CHLORIDE SERPL-SCNC: 105 MMOL/L (ref 96–106)
CHOLEST SERPL-MCNC: 119 MG/DL (ref 100–199)
CO2 SERPL-SCNC: 23 MMOL/L (ref 20–29)
CREAT SERPL-MCNC: 1.2 MG/DL (ref 0.76–1.27)
GLOBULIN SER CALC-MCNC: 2.2 G/DL (ref 1.5–4.5)
GLUCOSE SERPL-MCNC: 88 MG/DL (ref 65–99)
HDLC SERPL-MCNC: 30 MG/DL
INTERPRETATION, 910389: NORMAL
LDLC SERPL CALC-MCNC: 66 MG/DL (ref 0–99)
POTASSIUM SERPL-SCNC: 4.5 MMOL/L (ref 3.5–5.2)
PROT SERPL-MCNC: 6.7 G/DL (ref 6–8.5)
SODIUM SERPL-SCNC: 142 MMOL/L (ref 134–144)
TRIGL SERPL-MCNC: 117 MG/DL (ref 0–149)
VLDLC SERPL CALC-MCNC: 23 MG/DL (ref 5–40)

## 2018-08-24 ENCOUNTER — OFFICE VISIT (OUTPATIENT)
Dept: CARDIOLOGY CLINIC | Age: 73
End: 2018-08-24

## 2018-08-24 VITALS
DIASTOLIC BLOOD PRESSURE: 70 MMHG | HEART RATE: 59 BPM | BODY MASS INDEX: 25.62 KG/M2 | RESPIRATION RATE: 18 BRPM | SYSTOLIC BLOOD PRESSURE: 122 MMHG | WEIGHT: 179 LBS | HEIGHT: 70 IN | OXYGEN SATURATION: 98 %

## 2018-08-24 DIAGNOSIS — E78.2 MIXED HYPERLIPIDEMIA: ICD-10-CM

## 2018-08-24 DIAGNOSIS — I25.10 CORONARY ARTERY DISEASE INVOLVING NATIVE CORONARY ARTERY OF NATIVE HEART WITHOUT ANGINA PECTORIS: Primary | ICD-10-CM

## 2018-08-24 DIAGNOSIS — Z95.1 S/P CABG X 3: ICD-10-CM

## 2018-08-24 DIAGNOSIS — I10 ESSENTIAL HYPERTENSION: ICD-10-CM

## 2018-08-24 RX ORDER — TAMSULOSIN HYDROCHLORIDE 0.4 MG/1
0.4 CAPSULE ORAL DAILY
COMMUNITY

## 2018-08-24 NOTE — MR AVS SNAPSHOT
[address] [facility] [address] [place] [address] 
[phone] Patient: Mallika Jimenez MRN: [id number] Visit Information Date & Time Provider Department Dept. Phone Encounter #  
 8/24/2018  2:20 PM Lillie Mendoza MD [facility] [place] [phone] [id number] Follow-up Instructions Return in about 6 months (around 2/24/2019). Your Appointments 8/24/2018  2:20 PM  
ESTABLISHED PATIENT with Lillie Mendoza MD  
[facility] ([address] [facility]) Appt Note: 6 mo f/u; 6 mo f/u rsd w/pt 8/17 to 8/24 due to Dr. Thee Vo [facility] [place] coverage kmr [address]  [address] [place] [address]  
[address] [address] Upcoming Health Maintenance Date Due Hepatitis C Screening 1945 DTaP/Tdap/Td series (1 - Tdap) 11/11/1968 FOBT Q 1 YEAR AGE 50-75 11/11/1997 ZOSTER VACCINE AGE 60> 9/11/2007 GLAUCOMA SCREENING Q2Y 11/11/2012 MEDICARE YEARLY EXAM 3/14/2018 Influenza Age 5 to Adult 8/1/2018 Pneumococcal 65+ Low/Medium Risk (2 of 2 - PPSV23) 4/15/2020 Allergies as of 8/24/2018  Review Complete On: 8/24/2018 By: Lillie Mendoza MD  
  
 Severity Noted Reaction Type Reactions Flu Vacc 2012 (18+)c. deriv(pf)  08/25/2016    Unknown (comments) Current Immunizations  Reviewed on 10/25/2016 Name Date Pneumococcal Vaccine (Unspecified Type) 4/15/2015 Not reviewed this visit You Were Diagnosed With   
  
 Codes Comments Coronary artery disease involving native coronary artery of native heart without angina pectoris    -  Primary ICD-10-CM: I25.10 ICD-9-CM: 414.01 Essential hypertension     ICD-10-CM: I10 
ICD-9-CM: 401.9 Mixed hyperlipidemia     ICD-10-CM: E78.2 ICD-9-CM: 272.2 S/P CABG x 3     ICD-10-CM: Z95.1 ICD-9-CM: V45.81 Vitals BP Pulse Resp Height(growth percentile) Weight(growth percentile) SpO2  
 122/70 (BP 1 Location: Left arm, BP Patient Position: Sitting) (!) 59 18 5' 10\" (1.778 m) 179 lb (81.2 kg) 98% BMI Smoking Status 25.68 kg/m2 Never Smoker Vitals History BMI and BSA Data Body Mass Index Body Surface Area  
 25.68 kg/m 2 2 m 2 Preferred Pharmacy Pharmacy Name Phone [facility], [facility] [phone] Your Updated Medication List  
  
   
This list is accurate as of 8/24/18  2:12 PM.  Always use your most recent med list.  
  
  
  
  
 allopurinol 300 mg tablet Commonly known as:  Davie Moultrie Take  by mouth daily. aspirin delayed-release 81 mg tablet Take  by mouth daily. FLOMAX 0.4 mg capsule Generic drug:  tamsulosin Take 0.4 mg by mouth daily. fluticasone 50 mcg/actuation nasal spray Commonly known as:  Yarelis Finely 2 Sprays by Both Nostrils route as needed. LIPITOR 20 mg tablet Generic drug:  atorvastatin Take  by mouth daily. metoprolol succinate 100 mg tablet Commonly known as:  TOPROL XL Take 1 Tab by mouth daily. MIRALAX 17 gram/dose powder Generic drug:  polyethylene glycol Take 17 g by mouth daily. nitroglycerin 0.4 mg SL tablet Commonly known as:  NITROSTAT  
1 Tab by SubLINGual route every five (5) minutes as needed for Chest Pain (Max of 3 doses then call 911). oxyCODONE-acetaminophen 5-325 mg per tablet Commonly known as:  PERCOCET Take 1 Tab by mouth every six (6) hours as needed for Pain. Max Daily Amount: 4 Tabs. potassium citrate 10 mEq (1,080 mg) Stephanwarren Navneet Commonly known as:  [place] Take 10 mEq by mouth daily. TRICOR 145 mg tablet Generic drug:  fenofibrate nanocrystallized Take  by mouth daily. VITAMIN D3 1,000 unit Cap Generic drug:  cholecalciferol Take  by mouth daily. Follow-up Instructions Return in about 6 months (around 2/24/2019). Introducing [facility] & [facility] SERVICES! Dear Rosemary Mention: Thank you for requesting a [employer] account. Our records indicate that you have previously registered for a [employer] account but its currently inactive. Please call our [employer] support line at [phone]. Additional Information If you have questions, please visit the Frequently Asked Questions section of the [employer] website at [email or web address] Remember, [employer] is NOT to be used for urgent needs. For medical emergencies, dial 911. Now available from your iPhone and Android! Please provide this summary of care documentation to your next provider. Your primary care clinician is listed as Anh Latif III. If you have any questions after today's visit, please call [phone].

## 2018-08-24 NOTE — PROGRESS NOTES
HISTORY OF PRESENT ILLNESS  Malinda Vivas is a 81 y.o. male     SUMMARY:   Problem List  Date Reviewed: 8/24/2018          Codes Class Noted    Postoperative anemia due to acute blood loss ICD-10-CM: D62  ICD-9-CM: 285.1  9/16/2016        S/P CABG x 3 ICD-10-CM: Z95.1  ICD-9-CM: V45.81  9/15/2016    Overview Signed 9/15/2016 11:20 AM by KATHYA Eli     CORONARY ARTERY BYPASS GRAFT x3, LIMA to LAD. RSVG to Dx. RSVG to PDA  Right endoscopic greater saphenous vein harvest               CAD (coronary artery disease) ICD-10-CM: I25.10  ICD-9-CM: 414.00  9/13/2016    Overview Signed 11/8/2016  7:37 AM by Deniz Kim MD     9/16 stress echo positive with findings consistent with left main or multivessel disease             SOB (shortness of breath) ICD-10-CM: R06.02  ICD-9-CM: 786.05  8/31/2016        Precordial pain ICD-10-CM: R07.2  ICD-9-CM: 786.51  8/31/2016    Overview Signed 8/31/2016  4:43 PM by Yves Dotson MD     11/02 [facility], stress echo, ?distal lateral wall infarct with improved wall motion following exercise             HTN (hypertension) ICD-10-CM: I10  ICD-9-CM: 401.9  8/31/2016        Mixed hyperlipidemia ICD-10-CM: T41.0  ICD-9-CM: 272.2  8/31/2016              Current Outpatient Prescriptions on File Prior to Visit   Medication Sig    polyethylene glycol (MIRALAX) 17 gram/dose powder Take 17 g by mouth daily.  nitroglycerin (NITROSTAT) 0.4 mg SL tablet 1 Tab by SubLINGual route every five (5) minutes as needed for Chest Pain (Max of 3 doses then call 911).  metoprolol succinate (TOPROL XL) 100 mg tablet Take 1 Tab by mouth daily.  oxyCODONE-acetaminophen (PERCOCET) 5-325 mg per tablet Take 1 Tab by mouth every six (6) hours as needed for Pain. Max Daily Amount: 4 Tabs.  fluticasone (FLONASE) 50 mcg/actuation nasal spray 2 Sprays by Both Nostrils route as needed.  allopurinol (ZYLOPRIM) 300 mg tablet Take  by mouth daily.     fenofibrate nanocrystallized (TRICOR) 145 mg tablet Take  by mouth daily.  atorvastatin (LIPITOR) 20 mg tablet Take  by mouth daily.  potassium citrate (UROCIT-K10) 10 mEq (1,080 mg) TbER Take 10 mEq by mouth daily.  aspirin delayed-release 81 mg tablet Take  by mouth daily.  cholecalciferol (VITAMIN D3) 1,000 unit cap Take  by mouth daily. No current facility-administered medications on file prior to visit. CARDIOLOGY STUDIES TO DATE:  9/16 stress echo positive with findings consistent with left main or multivessel disease        Chief Complaint   Patient presents with    Coronary Artery Disease     HPI :  Mr. Nolan Thornton is doing well. He is still walking every day with no worrisome cardiac symptoms or problems with his medications. Blood pressure is under good control and recent lipid profile showed an HDL of 30 and LDL of 62. His glucose was normal.  He thinks that he has put on a little weight, but by our scale he has actually lost three pounds since we last met. CARDIAC ROS:   negative for chest pain, dyspnea, palpitations, syncope, orthopnea, paroxysmal nocturnal dyspnea, exertional chest pressure/discomfort, claudication, lower extremity edema    Family History   Problem Relation Age of Onset    Cancer Father      lung    Heart Disease Neg Hx        Past Medical History:   Diagnosis Date    CAD (coronary artery disease) 09/15/2016    CABG x3    HTN (hypertension)     Hyperlipidemia     Kidney stones     Polio        GENERAL ROS:  A comprehensive review of systems was negative except for that written in the HPI.     Visit Vitals    /70 (BP 1 Location: Left arm, BP Patient Position: Sitting)    Pulse (!) 59    Resp 18    Ht 5' 10\" (1.778 m)    Wt 179 lb (81.2 kg)    SpO2 98%    BMI 25.68 kg/m2       Wt Readings from Last 3 Encounters:   08/24/18 179 lb (81.2 kg)   02/16/18 182 lb 9.6 oz (82.8 kg)   08/04/17 173 lb 12.8 oz (78.8 kg)            BP Readings from Last 3 Encounters:   08/24/18 122/70   02/16/18 100/60   08/04/17 108/68       PHYSICAL EXAM  General appearance: alert, cooperative, no distress, appears stated age  Neck: supple, symmetrical, trachea midline, no adenopathy, no carotid bruit and no JVD  Lungs: clear to auscultation bilaterally  Heart: regular rate and rhythm, S1, S2 normal, no murmur, click, rub or gallop  Extremities: extremities normal, atraumatic, no cyanosis or edema    Lab Results   Component Value Date/Time    Cholesterol, total 119 08/15/2018 08:07 AM    Cholesterol, total 115 02/01/2018 08:22 AM    Cholesterol, total 105 07/26/2017 07:54 AM    Cholesterol, total 116 01/31/2017 08:14 AM    HDL Cholesterol 30 (L) 08/15/2018 08:07 AM    HDL Cholesterol 29 (L) 02/01/2018 08:22 AM    HDL Cholesterol 32 (L) 07/26/2017 07:54 AM    HDL Cholesterol 27 (L) 01/31/2017 08:14 AM    LDL, calculated 66 08/15/2018 08:07 AM    LDL, calculated 64 02/01/2018 08:22 AM    LDL, calculated 58 07/26/2017 07:54 AM    LDL, calculated 67 01/31/2017 08:14 AM    Triglyceride 117 08/15/2018 08:07 AM    Triglyceride 111 02/01/2018 08:22 AM    Triglyceride 73 07/26/2017 07:54 AM    Triglyceride 109 01/31/2017 08:14 AM     ASSESSMENT  Mr. Susanne Pineda is stable, asymptomatic and well compensated on a good medical regimen and needs no cardiac testing at this time. He already has a lab slip for follow-up blood work. current treatment plan is effective, no change in therapy  lab results and schedule of future lab studies reviewed with patient  reviewed diet, exercise and weight control    Encounter Diagnoses   Name Primary?  Coronary artery disease involving native coronary artery of native heart without angina pectoris Yes    Essential hypertension     Mixed hyperlipidemia     S/P CABG x 3      Orders Placed This Encounter    tamsulosin (FLOMAX) 0.4 mg capsule       Follow-up Disposition:  Return in about 6 months (around 2/24/2019).     Abdi Ragland MD  8/24/2018

## 2019-01-01 DIAGNOSIS — I25.10 CORONARY ARTERY DISEASE INVOLVING NATIVE CORONARY ARTERY OF NATIVE HEART WITHOUT ANGINA PECTORIS: ICD-10-CM

## 2019-01-01 DIAGNOSIS — E78.2 MIXED HYPERLIPIDEMIA: ICD-10-CM

## 2019-02-20 DIAGNOSIS — E78.2 MIXED HYPERLIPIDEMIA: Primary | ICD-10-CM

## 2019-02-20 LAB
ALBUMIN SERPL-MCNC: 4.3 G/DL (ref 3.5–4.8)
ALBUMIN/GLOB SERPL: 2 {RATIO} (ref 1.2–2.2)
ALP SERPL-CCNC: 57 IU/L (ref 39–117)
ALT SERPL-CCNC: 17 IU/L (ref 0–44)
AST SERPL-CCNC: 20 IU/L (ref 0–40)
BILIRUB SERPL-MCNC: 1 MG/DL (ref 0–1.2)
BUN SERPL-MCNC: 28 MG/DL (ref 8–27)
BUN/CREAT SERPL: 22 (ref 10–24)
CALCIUM SERPL-MCNC: 9.6 MG/DL (ref 8.6–10.2)
CHLORIDE SERPL-SCNC: 105 MMOL/L (ref 96–106)
CHOLEST SERPL-MCNC: 120 MG/DL (ref 100–199)
CO2 SERPL-SCNC: 23 MMOL/L (ref 20–29)
CREAT SERPL-MCNC: 1.29 MG/DL (ref 0.76–1.27)
GLOBULIN SER CALC-MCNC: 2.2 G/DL (ref 1.5–4.5)
GLUCOSE SERPL-MCNC: 92 MG/DL (ref 65–99)
HDLC SERPL-MCNC: 27 MG/DL
INTERPRETATION, 910389: NORMAL
INTERPRETATION: NORMAL
LDLC SERPL CALC-MCNC: 66 MG/DL (ref 0–99)
PDF IMAGE, 910387: NORMAL
POTASSIUM SERPL-SCNC: 4.7 MMOL/L (ref 3.5–5.2)
PROT SERPL-MCNC: 6.5 G/DL (ref 6–8.5)
SODIUM SERPL-SCNC: 145 MMOL/L (ref 134–144)
TRIGL SERPL-MCNC: 137 MG/DL (ref 0–149)
VLDLC SERPL CALC-MCNC: 27 MG/DL (ref 5–40)

## 2019-02-28 ENCOUNTER — OFFICE VISIT (OUTPATIENT)
Dept: CARDIOLOGY CLINIC | Age: 74
End: 2019-02-28

## 2019-02-28 VITALS
SYSTOLIC BLOOD PRESSURE: 122 MMHG | WEIGHT: 199 LBS | OXYGEN SATURATION: 98 % | RESPIRATION RATE: 16 BRPM | HEIGHT: 70 IN | BODY MASS INDEX: 28.49 KG/M2 | DIASTOLIC BLOOD PRESSURE: 78 MMHG | HEART RATE: 73 BPM

## 2019-02-28 DIAGNOSIS — Z95.1 S/P CABG X 3: ICD-10-CM

## 2019-02-28 DIAGNOSIS — I10 ESSENTIAL HYPERTENSION: ICD-10-CM

## 2019-02-28 DIAGNOSIS — I25.10 CORONARY ARTERY DISEASE INVOLVING NATIVE CORONARY ARTERY OF NATIVE HEART WITHOUT ANGINA PECTORIS: Primary | ICD-10-CM

## 2019-02-28 DIAGNOSIS — E78.2 MIXED HYPERLIPIDEMIA: ICD-10-CM

## 2019-02-28 NOTE — PROGRESS NOTES
Identified pt with two pt identifiers(name and ). Reviewed record in preparation for visit and have obtained necessary documentation. No chief complaint on file. Health Maintenance Due Topic  Hepatitis C Screening  DTaP/Tdap/Td series (1 - Tdap)  Shingrix Vaccine Age 50> (1 of 2)  FOBT Q 1 YEAR AGE 50-75  GLAUCOMA SCREENING Q2Y  MEDICARE YEARLY EXAM   
Pt states he is not a candidate for flu vaccine, as he is allergic;  topic modified. Coordination of Care Questionnaire: 
:  
1) Have you been to an emergency room, urgent care, or hospitalized since your last visitf? If yes, where when, and reason for visit? no  
 
 
 
2. Have seen or consulted any other health care provider since your last visit? If yes, where when, and reason for visit? YES 
- Dr. Blue Calderon (uro) Patient is accompanied by self I have received verbal consent from Jenelle Kendall to discuss any/all medical information while they are present in the room.

## 2019-02-28 NOTE — PROGRESS NOTES
HISTORY OF PRESENT ILLNESS Dg Nunez is a 72 y.o. male SUMMARY:  
Problem List  Date Reviewed: 2/28/2019 Codes Class Noted CAD (coronary artery disease) ICD-10-CM: I25.10 ICD-9-CM: 414.00  9/13/2016 Overview Signed 11/8/2016  7:37 AM by Kianna Morton MD  
  9/16 stress echo positive with findings consistent with left main or multivessel disease Postoperative anemia due to acute blood loss ICD-10-CM: D62 
ICD-9-CM: 285.1  9/16/2016 S/P CABG x 3 ICD-10-CM: Z95.1 ICD-9-CM: V45.81  9/15/2016 Overview Signed 9/15/2016 11:20 AM by KATHYA Barlow  
  CORONARY ARTERY BYPASS GRAFT x3, LIMA to LAD. RSVG to Dx. RSVG to PDA Right endoscopic greater saphenous vein harvest 
 
  
  
   
 SOB (shortness of breath) ICD-10-CM: R06.02 
ICD-9-CM: 786.05  8/31/2016 Precordial pain ICD-10-CM: R07.2 ICD-9-CM: 786.51  8/31/2016 Overview Signed 8/31/2016  4:43 PM by Lakesha Sosa MD  
  11/02 [facility], stress echo, ?distal lateral wall infarct with improved wall motion following exercise HTN (hypertension) ICD-10-CM: I10 
ICD-9-CM: 401.9  8/31/2016 Mixed hyperlipidemia ICD-10-CM: E78.2 ICD-9-CM: 272.2  8/31/2016 Current Outpatient Medications on File Prior to Visit Medication Sig  psyllium (METAMUCIL) packet Take 1 Packet by mouth daily.  tamsulosin (FLOMAX) 0.4 mg capsule Take 0.4 mg by mouth daily.  metoprolol succinate (TOPROL XL) 100 mg tablet Take 1 Tab by mouth daily.  oxyCODONE-acetaminophen (PERCOCET) 5-325 mg per tablet Take 1 Tab by mouth every six (6) hours as needed for Pain. Max Daily Amount: 4 Tabs.  fluticasone (FLONASE) 50 mcg/actuation nasal spray 2 Sprays by Both Nostrils route as needed.  allopurinol (ZYLOPRIM) 300 mg tablet Take 300 mg by mouth daily.  fenofibrate nanocrystallized (TRICOR) 145 mg tablet Take 145 mg by mouth daily.  atorvastatin (LIPITOR) 20 mg tablet Take 20 mg by mouth daily.  potassium citrate (UROCIT-K10) 10 mEq (1,080 mg) TbER Take 10 mEq by mouth daily.  aspirin delayed-release 81 mg tablet Take 81 mg by mouth daily.  cholecalciferol (VITAMIN D3) 1,000 unit cap Take 1,000 Units by mouth daily.  polyethylene glycol (MIRALAX) 17 gram/dose powder Take 17 g by mouth daily.  nitroglycerin (NITROSTAT) 0.4 mg SL tablet 1 Tab by SubLINGual route every five (5) minutes as needed for Chest Pain (Max of 3 doses then call 911). No current facility-administered medications on file prior to visit. CARDIOLOGY STUDIES TO DATE: 
9/16 stress echo positive with findings consistent with left main or multivessel disease Chief Complaint Patient presents with  Coronary Artery Disease HPI : 
Mr. Cheron Boeck is doing great, although he has not been eating properly and he has put on 20 pounds since we saw him in August.  He still exercises regularly with no worrisome symptoms or problems with his medications. Recent lipid profile looked great with an LDL of 66 and HDL was still low at 27. CARDIAC ROS:  
negative for chest pain, dyspnea, palpitations, syncope, orthopnea, paroxysmal nocturnal dyspnea, exertional chest pressure/discomfort, claudication, lower extremity edema Family History Problem Relation Age of Onset  Cancer Father   
     lung  Heart Disease Neg Hx Past Medical History:  
Diagnosis Date  CAD (coronary artery disease) 09/15/2016 CABG x3  
 HTN (hypertension)  Hyperlipidemia  Kidney stones  Polio GENERAL ROS: 
A comprehensive review of systems was negative except for that written in the HPI. Visit Vitals /78 (BP 1 Location: Left arm, BP Patient Position: Sitting) Pulse 73 Resp 16 Ht 5' 10\" (1.778 m) Wt 199 lb (90.3 kg) SpO2 98% BMI 28.55 kg/m² Wt Readings from Last 3 Encounters:  
02/28/19 199 lb (90.3 kg) 08/24/18 179 lb (81.2 kg) 02/16/18 182 lb 9.6 oz (82.8 kg) BP Readings from Last 3 Encounters:  
02/28/19 122/78  
08/24/18 122/70  
02/16/18 100/60 PHYSICAL EXAM 
General appearance: alert, cooperative, no distress, appears stated age Neurologic: Alert and oriented X 3 Neck: supple, symmetrical, trachea midline, no adenopathy, no carotid bruit and no JVD Lungs: clear to auscultation bilaterally Heart: regular rate and rhythm, S1, S2 normal, no murmur, click, rub or gallop Extremities: extremities normal, atraumatic, no cyanosis or edema Lab Results Component Value Date/Time Cholesterol, total 120 02/19/2019 07:56 AM  
 Cholesterol, total 119 08/15/2018 08:07 AM  
 Cholesterol, total 115 02/01/2018 08:22 AM  
 Cholesterol, total 105 07/26/2017 07:54 AM  
 Cholesterol, total 116 01/31/2017 08:14 AM  
 HDL Cholesterol 27 (L) 02/19/2019 07:56 AM  
 HDL Cholesterol 30 (L) 08/15/2018 08:07 AM  
 HDL Cholesterol 29 (L) 02/01/2018 08:22 AM  
 HDL Cholesterol 32 (L) 07/26/2017 07:54 AM  
 HDL Cholesterol 27 (L) 01/31/2017 08:14 AM  
 LDL, calculated 66 02/19/2019 07:56 AM  
 LDL, calculated 66 08/15/2018 08:07 AM  
 LDL, calculated 64 02/01/2018 08:22 AM  
 LDL, calculated 58 07/26/2017 07:54 AM  
 LDL, calculated 67 01/31/2017 08:14 AM  
 Triglyceride 137 02/19/2019 07:56 AM  
 Triglyceride 117 08/15/2018 08:07 AM  
 Triglyceride 111 02/01/2018 08:22 AM  
 Triglyceride 73 07/26/2017 07:54 AM  
 Triglyceride 109 01/31/2017 08:14 AM  
 
ASSESSMENT Mr. Carline Albright is stable, asymptomatic and well compensated on a good medical regimen and needs no cardiac testing at this time. He already has a lab slip for follow-up blood work. current treatment plan is effective, no change in therapy 
lab results and schedule of future lab studies reviewed with patient 
reviewed diet, exercise and weight control Encounter Diagnoses Name Primary?  Coronary artery disease involving native coronary artery of native heart without angina pectoris Yes  S/P CABG x 3   
 Mixed hyperlipidemia  Essential hypertension Orders Placed This Encounter  psyllium (METAMUCIL) packet Follow-up Disposition: 
Return in about 6 months (around 8/28/2019). Preethi Campo MD2/28/2019

## 2019-08-21 ENCOUNTER — HOSPITAL ENCOUNTER (OUTPATIENT)
Dept: LAB | Age: 74
Discharge: HOME | End: 2019-08-21
Payer: MEDICARE

## 2019-08-21 PROCEDURE — 80053 COMPREHEN METABOLIC PANEL: CPT

## 2019-08-21 PROCEDURE — 36415 COLL VENOUS BLD VENIPUNCTURE: CPT

## 2019-08-21 PROCEDURE — 80061 LIPID PANEL: CPT

## 2019-08-22 LAB
ALBUMIN SERPL-MCNC: 4.1 G/DL (ref 3.5–4.8)
ALBUMIN/GLOB SERPL: 1.8 {RATIO} (ref 1.2–2.2)
ALP SERPL-CCNC: 59 IU/L (ref 39–117)
ALT SERPL-CCNC: 17 IU/L (ref 0–44)
AST SERPL-CCNC: 22 IU/L (ref 0–40)
BILIRUB SERPL-MCNC: 0.7 MG/DL (ref 0–1.2)
BUN SERPL-MCNC: 19 MG/DL (ref 8–27)
BUN/CREAT SERPL: 15 (ref 10–24)
CALCIUM SERPL-MCNC: 9.3 MG/DL (ref 8.6–10.2)
CHLORIDE SERPL-SCNC: 108 MMOL/L (ref 96–106)
CHOLEST SERPL-MCNC: 106 MG/DL (ref 100–199)
CO2 SERPL-SCNC: 24 MMOL/L (ref 20–29)
CREAT SERPL-MCNC: 1.25 MG/DL (ref 0.76–1.27)
GLOBULIN SER CALC-MCNC: 2.3 G/DL (ref 1.5–4.5)
GLUCOSE SERPL-MCNC: 97 MG/DL (ref 65–99)
HDLC SERPL-MCNC: 28 MG/DL
INTERPRETATION, 910389: NORMAL
INTERPRETATION: NORMAL
LDLC SERPL CALC-MCNC: 53 MG/DL (ref 0–99)
PDF IMAGE, 910387: NORMAL
POTASSIUM SERPL-SCNC: 4.8 MMOL/L (ref 3.5–5.2)
PROT SERPL-MCNC: 6.4 G/DL (ref 6–8.5)
SODIUM SERPL-SCNC: 145 MMOL/L (ref 134–144)
TRIGL SERPL-MCNC: 126 MG/DL (ref 0–149)
VLDLC SERPL CALC-MCNC: 25 MG/DL (ref 5–40)

## 2019-08-29 ENCOUNTER — OFFICE VISIT (OUTPATIENT)
Dept: CARDIOLOGY CLINIC | Age: 74
End: 2019-08-29

## 2019-08-29 VITALS
HEART RATE: 71 BPM | BODY MASS INDEX: 28.6 KG/M2 | OXYGEN SATURATION: 97 % | SYSTOLIC BLOOD PRESSURE: 138 MMHG | DIASTOLIC BLOOD PRESSURE: 80 MMHG | RESPIRATION RATE: 18 BRPM | HEIGHT: 70 IN | WEIGHT: 199.8 LBS

## 2019-08-29 DIAGNOSIS — I10 ESSENTIAL HYPERTENSION: ICD-10-CM

## 2019-08-29 DIAGNOSIS — Z95.1 S/P CABG X 3: ICD-10-CM

## 2019-08-29 DIAGNOSIS — I25.10 CORONARY ARTERY DISEASE INVOLVING NATIVE CORONARY ARTERY OF NATIVE HEART WITHOUT ANGINA PECTORIS: Primary | ICD-10-CM

## 2019-08-29 DIAGNOSIS — R06.02 SOB (SHORTNESS OF BREATH): ICD-10-CM

## 2019-08-29 DIAGNOSIS — E78.2 MIXED HYPERLIPIDEMIA: ICD-10-CM

## 2019-08-29 RX ORDER — NITROGLYCERIN 0.4 MG/1
0.4 TABLET SUBLINGUAL
Qty: 1 BOTTLE | Refills: 1 | Status: SHIPPED | OUTPATIENT
Start: 2019-08-29 | End: 2021-04-02 | Stop reason: SDUPTHER

## 2019-08-29 NOTE — PROGRESS NOTES
HISTORY OF PRESENT ILLNESS  Ra Santoyo is a 72 y.o. male     SUMMARY:   Problem List  Date Reviewed: 8/28/2019          Codes Class Noted    CAD (coronary artery disease) ICD-10-CM: I25.10  ICD-9-CM: 414.00  9/13/2016    Overview Signed 11/8/2016  7:37 AM by Vee Linn MD     9/16 stress echo positive with findings consistent with left main or multivessel disease             Postoperative anemia due to acute blood loss ICD-10-CM: D62  ICD-9-CM: 285.1  9/16/2016        S/P CABG x 3 ICD-10-CM: Z95.1  ICD-9-CM: V45.81  9/15/2016    Overview Signed 9/15/2016 11:20 AM by KATHYA Hawkins     CORONARY ARTERY BYPASS GRAFT x3, LIMA to LAD. RSVG to Dx. RSVG to PDA  Right endoscopic greater saphenous vein harvest               SOB (shortness of breath) ICD-10-CM: R06.02  ICD-9-CM: 786.05  8/31/2016        Precordial pain ICD-10-CM: R07.2  ICD-9-CM: 786.51  8/31/2016    Overview Signed 8/31/2016  4:43 PM by Seble Morse MD     11/02 [facility], stress echo, ?distal lateral wall infarct with improved wall motion following exercise             HTN (hypertension) ICD-10-CM: I10  ICD-9-CM: 401.9  8/31/2016        Mixed hyperlipidemia ICD-10-CM: H10.7  ICD-9-CM: 272.2  8/31/2016              Current Outpatient Medications on File Prior to Visit   Medication Sig    psyllium (METAMUCIL) packet Take 1 Packet by mouth daily.  tamsulosin (FLOMAX) 0.4 mg capsule Take 0.4 mg by mouth daily.  polyethylene glycol (MIRALAX) 17 gram/dose powder Take 17 g by mouth daily.  nitroglycerin (NITROSTAT) 0.4 mg SL tablet 1 Tab by SubLINGual route every five (5) minutes as needed for Chest Pain (Max of 3 doses then call 911).  metoprolol succinate (TOPROL XL) 100 mg tablet Take 1 Tab by mouth daily.  oxyCODONE-acetaminophen (PERCOCET) 5-325 mg per tablet Take 1 Tab by mouth every six (6) hours as needed for Pain. Max Daily Amount: 4 Tabs.     fluticasone (FLONASE) 50 mcg/actuation nasal spray 2 Sprays by Both Nostrils route as needed.  allopurinol (ZYLOPRIM) 300 mg tablet Take 300 mg by mouth daily.  fenofibrate nanocrystallized (TRICOR) 145 mg tablet Take 145 mg by mouth daily.  atorvastatin (LIPITOR) 20 mg tablet Take 20 mg by mouth daily.  potassium citrate (UROCIT-K10) 10 mEq (1,080 mg) TbER Take 10 mEq by mouth daily.  aspirin delayed-release 81 mg tablet Take 81 mg by mouth daily.  cholecalciferol (VITAMIN D3) 1,000 unit cap Take 1,000 Units by mouth daily. No current facility-administered medications on file prior to visit. CARDIOLOGY STUDIES TO DATE:  9/16 stress echo positive with findings consistent with left main or multivessel disease    Chief Complaint   Patient presents with    Coronary Artery Disease     HPI :  Mr. Jarvis Hatch is doing great. He is walking one and a half to two miles every day with no worrisome symptoms or problems with his medication. Recent lipid profile looked excellent with an LDL of 53 and HDL of 29. CARDIAC ROS:   negative for chest pain, dyspnea, palpitations, syncope, orthopnea, paroxysmal nocturnal dyspnea, exertional chest pressure/discomfort, claudication, lower extremity edema    Family History   Problem Relation Age of Onset    Cancer Father         lung    Heart Disease Neg Hx        Past Medical History:   Diagnosis Date    CAD (coronary artery disease) 09/15/2016    CABG x3    HTN (hypertension)     Hyperlipidemia     Kidney stones     Polio        GENERAL ROS:  A comprehensive review of systems was negative except for that written in the HPI.     Visit Vitals  /80 (BP 1 Location: Left arm, BP Patient Position: Sitting)   Pulse 71   Resp 18   Ht 5' 10\" (1.778 m)   Wt 199 lb 12.8 oz (90.6 kg)   SpO2 97%   BMI 28.67 kg/m²       Wt Readings from Last 3 Encounters:   08/29/19 199 lb 12.8 oz (90.6 kg)   02/28/19 199 lb (90.3 kg)   08/24/18 179 lb (81.2 kg)            BP Readings from Last 3 Encounters:   08/29/19 138/80 02/28/19 122/78   08/24/18 122/70       PHYSICAL EXAM  General appearance: alert, cooperative, no distress, appears stated age  Neurologic: Alert and oriented X 3  Neck: supple, symmetrical, trachea midline, no adenopathy, no carotid bruit and no JVD  Lungs: clear to auscultation bilaterally  Heart: regular rate and rhythm, S1, S2 normal, no murmur, click, rub or gallop  Extremities: extremities normal, atraumatic, no cyanosis or edema    Lab Results   Component Value Date/Time    Cholesterol, total 106 08/21/2019 08:10 AM    Cholesterol, total 120 02/19/2019 07:56 AM    Cholesterol, total 119 08/15/2018 08:07 AM    Cholesterol, total 115 02/01/2018 08:22 AM    Cholesterol, total 105 07/26/2017 07:54 AM    HDL Cholesterol 28 (L) 08/21/2019 08:10 AM    HDL Cholesterol 27 (L) 02/19/2019 07:56 AM    HDL Cholesterol 30 (L) 08/15/2018 08:07 AM    HDL Cholesterol 29 (L) 02/01/2018 08:22 AM    HDL Cholesterol 32 (L) 07/26/2017 07:54 AM    LDL, calculated 53 08/21/2019 08:10 AM    LDL, calculated 66 02/19/2019 07:56 AM    LDL, calculated 66 08/15/2018 08:07 AM    LDL, calculated 64 02/01/2018 08:22 AM    LDL, calculated 58 07/26/2017 07:54 AM    Triglyceride 126 08/21/2019 08:10 AM    Triglyceride 137 02/19/2019 07:56 AM    Triglyceride 117 08/15/2018 08:07 AM    Triglyceride 111 02/01/2018 08:22 AM    Triglyceride 73 07/26/2017 07:54 AM     ASSESSMENT  Mr. Rubén Del Toro is stable and asymptomatic and well compensated on a good medical regimen and needs no cardiac testing at this time. current treatment plan is effective, no change in therapy  lab results and schedule of future lab studies reviewed with patient  reviewed diet, exercise and weight control    Encounter Diagnoses   Name Primary?     Coronary artery disease involving native coronary artery of native heart without angina pectoris Yes    Essential hypertension     Mixed hyperlipidemia     S/P CABG x 3      No orders of the defined types were placed in this encounter. Follow-up and Dispositions    · Return in about 6 months (around 2/29/2020).          Kaveh Martell MD  8/29/2019

## 2020-02-20 ENCOUNTER — HOSPITAL ENCOUNTER (OUTPATIENT)
Dept: LAB | Age: 75
Discharge: HOME | End: 2020-02-20
Payer: MEDICARE

## 2020-02-20 PROCEDURE — 80061 LIPID PANEL: CPT

## 2020-02-20 PROCEDURE — 80053 COMPREHEN METABOLIC PANEL: CPT

## 2020-02-20 PROCEDURE — 36415 COLL VENOUS BLD VENIPUNCTURE: CPT

## 2020-02-21 ENCOUNTER — TELEPHONE (OUTPATIENT)
Dept: CARDIOLOGY CLINIC | Age: 75
End: 2020-02-21

## 2020-02-21 LAB
ALBUMIN SERPL-MCNC: 4.3 G/DL (ref 3.7–4.7)
ALBUMIN/GLOB SERPL: 2.2 {RATIO} (ref 1.2–2.2)
ALP SERPL-CCNC: 60 IU/L (ref 39–117)
ALT SERPL-CCNC: 16 IU/L (ref 0–44)
AST SERPL-CCNC: 19 IU/L (ref 0–40)
BILIRUB SERPL-MCNC: 0.8 MG/DL (ref 0–1.2)
BUN SERPL-MCNC: 22 MG/DL (ref 8–27)
BUN/CREAT SERPL: 16 (ref 10–24)
CALCIUM SERPL-MCNC: 9.1 MG/DL (ref 8.6–10.2)
CHLORIDE SERPL-SCNC: 106 MMOL/L (ref 96–106)
CHOLEST SERPL-MCNC: 119 MG/DL (ref 100–199)
CO2 SERPL-SCNC: 25 MMOL/L (ref 20–29)
CREAT SERPL-MCNC: 1.37 MG/DL (ref 0.76–1.27)
GLOBULIN SER CALC-MCNC: 2 G/DL (ref 1.5–4.5)
GLUCOSE SERPL-MCNC: 89 MG/DL (ref 65–99)
HDLC SERPL-MCNC: 26 MG/DL
INTERPRETATION, 910389: NORMAL
INTERPRETATION: NORMAL
LDLC SERPL CALC-MCNC: 62 MG/DL (ref 0–99)
PDF IMAGE, 910387: NORMAL
POTASSIUM SERPL-SCNC: 4.8 MMOL/L (ref 3.5–5.2)
PROT SERPL-MCNC: 6.3 G/DL (ref 6–8.5)
SODIUM SERPL-SCNC: 143 MMOL/L (ref 134–144)
TRIGL SERPL-MCNC: 156 MG/DL (ref 0–149)
VLDLC SERPL CALC-MCNC: 31 MG/DL (ref 5–40)

## 2020-02-21 NOTE — PROGRESS NOTES
Chol is ok. Kidney function is a little off, looks like a little dehydrated.  Drink more water and we will repeat around the time of next visit

## 2020-02-21 NOTE — TELEPHONE ENCOUNTER
----- Message from Vaughn Paiz MD sent at 2/21/2020 10:02 AM EST -----  Chol is ok. Kidney function is a little off, looks like a little dehydrated.  Drink more water and we will repeat around the time of next visit

## 2020-02-21 NOTE — TELEPHONE ENCOUNTER
Called patient. Left detailed message on safe voicemail stating results and Dr. Geovani Sumner message. I stated he will discuss further at upcoming appointment and we will give new lab slip for next time. I stated he can call back with any questions.

## 2020-03-02 ENCOUNTER — OFFICE VISIT (OUTPATIENT)
Dept: CARDIOLOGY CLINIC | Age: 75
End: 2020-03-02

## 2020-03-02 VITALS
HEIGHT: 70 IN | DIASTOLIC BLOOD PRESSURE: 70 MMHG | OXYGEN SATURATION: 98 % | RESPIRATION RATE: 16 BRPM | HEART RATE: 64 BPM | WEIGHT: 205 LBS | SYSTOLIC BLOOD PRESSURE: 120 MMHG | BODY MASS INDEX: 29.35 KG/M2

## 2020-03-02 DIAGNOSIS — I25.10 CORONARY ARTERY DISEASE INVOLVING NATIVE CORONARY ARTERY OF NATIVE HEART WITHOUT ANGINA PECTORIS: Primary | ICD-10-CM

## 2020-03-02 DIAGNOSIS — Z95.1 S/P CABG X 3: ICD-10-CM

## 2020-03-02 DIAGNOSIS — E78.2 MIXED HYPERLIPIDEMIA: ICD-10-CM

## 2020-03-02 DIAGNOSIS — I10 ESSENTIAL HYPERTENSION: ICD-10-CM

## 2020-03-02 NOTE — PROGRESS NOTES
HISTORY OF PRESENT ILLNESS  Matteo Galicia is a 69 y.o. male     SUMMARY:   Problem List  Date Reviewed: 3/2/2020          Codes Class Noted    CAD (coronary artery disease) ICD-10-CM: I25.10  ICD-9-CM: 414.00  9/13/2016    Overview Signed 11/8/2016  7:37 AM by Dahiana Cowart MD     9/16 stress echo positive with findings consistent with left main or multivessel disease             Postoperative anemia due to acute blood loss ICD-10-CM: D62  ICD-9-CM: 285.1  9/16/2016        S/P CABG x 3 ICD-10-CM: Z95.1  ICD-9-CM: V45.81  9/15/2016    Overview Signed 9/15/2016 11:20 AM by KATHYA Choi     CORONARY ARTERY BYPASS GRAFT x3, LIMA to LAD. RSVG to Dx. RSVG to PDA  Right endoscopic greater saphenous vein harvest               SOB (shortness of breath) ICD-10-CM: R06.02  ICD-9-CM: 786.05  8/31/2016        Precordial pain ICD-10-CM: R07.2  ICD-9-CM: 786.51  8/31/2016    Overview Signed 8/31/2016  4:43 PM by Kenneth Azevedo MD     11/02 [facility], stress echo, ?distal lateral wall infarct with improved wall motion following exercise             HTN (hypertension) ICD-10-CM: I10  ICD-9-CM: 401.9  8/31/2016        Mixed hyperlipidemia ICD-10-CM: E78.2  ICD-9-CM: 272.2  8/31/2016              Current Outpatient Medications on File Prior to Visit   Medication Sig    nitroglycerin (NITROSTAT) 0.4 mg SL tablet 1 Tab by SubLINGual route every five (5) minutes as needed for Chest Pain (Max of 3 doses then call 911).  psyllium (METAMUCIL) packet Take 1 Packet by mouth daily.  tamsulosin (FLOMAX) 0.4 mg capsule Take 0.4 mg by mouth daily.  polyethylene glycol (MIRALAX) 17 gram/dose powder Take 17 g by mouth daily.  metoprolol succinate (TOPROL XL) 100 mg tablet Take 1 Tab by mouth daily.  oxyCODONE-acetaminophen (PERCOCET) 5-325 mg per tablet Take 1 Tab by mouth every six (6) hours as needed for Pain. Max Daily Amount: 4 Tabs.     fluticasone (FLONASE) 50 mcg/actuation nasal spray 2 Sprays by Both Nostrils route as needed.  allopurinol (ZYLOPRIM) 300 mg tablet Take 300 mg by mouth daily.  fenofibrate nanocrystallized (TRICOR) 145 mg tablet Take 145 mg by mouth daily.  atorvastatin (LIPITOR) 20 mg tablet Take 20 mg by mouth daily.  potassium citrate (UROCIT-K10) 10 mEq (1,080 mg) TbER Take 10 mEq by mouth daily.  aspirin delayed-release 81 mg tablet Take 81 mg by mouth daily.  cholecalciferol (VITAMIN D3) 1,000 unit cap Take 1,000 Units by mouth daily. No current facility-administered medications on file prior to visit. CARDIOLOGY STUDIES TO DATE:  9/16 stress echo positive with findings consistent with left main or multivessel disease    Chief Complaint   Patient presents with    Coronary Artery Disease     HPI :  Mr. Nolan Thornton is doing well. He continues to walk almost every day with no worrisome symptoms or problems with his medications. Blood pressures under good control. Recent lipid profile looked great except for a low HDL and his creatinine was slightly elevated. CARDIAC ROS:   negative for chest pain, dyspnea, palpitations, syncope, orthopnea, paroxysmal nocturnal dyspnea, exertional chest pressure/discomfort, claudication, lower extremity edema    Family History   Problem Relation Age of Onset    Cancer Father         lung    Heart Disease Neg Hx        Past Medical History:   Diagnosis Date    CAD (coronary artery disease) 09/15/2016    CABG x3    HTN (hypertension)     Hyperlipidemia     Kidney stones     Polio        GENERAL ROS:  A comprehensive review of systems was negative except for that written in the HPI.     Visit Vitals  /70 (BP 1 Location: Left arm, BP Patient Position: Sitting)   Pulse 64   Resp 16   Ht 5' 10\" (1.778 m)   Wt 205 lb (93 kg)   SpO2 98%   BMI 29.41 kg/m²       Wt Readings from Last 3 Encounters:   03/02/20 205 lb (93 kg)   08/29/19 199 lb 12.8 oz (90.6 kg)   02/28/19 199 lb (90.3 kg)            BP Readings from Last 3 Encounters:   03/02/20 120/70   08/29/19 138/80   02/28/19 122/78       PHYSICAL EXAM  General appearance: alert, cooperative, no distress, appears stated age  Neurologic: Alert and oriented X 3  Neck: supple, symmetrical, trachea midline, no adenopathy, no carotid bruit and no JVD  Lungs: clear to auscultation bilaterally  Heart: regular rate and rhythm, S1, S2 normal, no murmur, click, rub or gallop  Extremities: extremities normal, atraumatic, no cyanosis or edema    Lab Results   Component Value Date/Time    Cholesterol, total 119 02/20/2020 07:39 AM    Cholesterol, total 106 08/21/2019 08:10 AM    Cholesterol, total 120 02/19/2019 07:56 AM    Cholesterol, total 119 08/15/2018 08:07 AM    Cholesterol, total 115 02/01/2018 08:22 AM    HDL Cholesterol 26 (L) 02/20/2020 07:39 AM    HDL Cholesterol 28 (L) 08/21/2019 08:10 AM    HDL Cholesterol 27 (L) 02/19/2019 07:56 AM    HDL Cholesterol 30 (L) 08/15/2018 08:07 AM    HDL Cholesterol 29 (L) 02/01/2018 08:22 AM    LDL, calculated 62 02/20/2020 07:39 AM    LDL, calculated 53 08/21/2019 08:10 AM    LDL, calculated 66 02/19/2019 07:56 AM    LDL, calculated 66 08/15/2018 08:07 AM    LDL, calculated 64 02/01/2018 08:22 AM    Triglyceride 156 (H) 02/20/2020 07:39 AM    Triglyceride 126 08/21/2019 08:10 AM    Triglyceride 137 02/19/2019 07:56 AM    Triglyceride 117 08/15/2018 08:07 AM    Triglyceride 111 02/01/2018 08:22 AM     ASSESSMENT :      Mr. Princess Clancy is stable asymptomatic and well compensated on a good medical regimen and needs no cardiac testing at this time. We are going to recheck his basic metabolic profile. current treatment plan is effective, no change in therapy  lab results and schedule of future lab studies reviewed with patient  reviewed diet, exercise and weight control    Encounter Diagnoses   Name Primary?     Coronary artery disease involving native coronary artery of native heart without angina pectoris Yes    Essential hypertension     Mixed hyperlipidemia     S/P CABG x 3      No orders of the defined types were placed in this encounter. Follow-up and Dispositions    · Return in about 6 months (around 9/2/2020). Jeff Messer MD  3/2/2020  Please note that this dictation was completed with [employer], the computer voice recognition software. Quite often unanticipated grammatical, syntax, homophones, and other interpretive errors are inadvertently transcribed by the computer software. Please disregard these errors. Please excuse any errors that have escaped final proofreading. Thank you.

## 2020-03-17 ENCOUNTER — HOSPITAL ENCOUNTER (OUTPATIENT)
Dept: LAB | Age: 75
Discharge: HOME | End: 2020-03-17
Payer: MEDICARE

## 2020-03-17 PROCEDURE — 36415 COLL VENOUS BLD VENIPUNCTURE: CPT

## 2020-03-17 PROCEDURE — 80048 BASIC METABOLIC PNL TOTAL CA: CPT

## 2020-03-18 LAB
BUN SERPL-MCNC: 21 MG/DL (ref 8–27)
BUN/CREAT SERPL: 15 (ref 10–24)
CALCIUM SERPL-MCNC: 9.5 MG/DL (ref 8.6–10.2)
CHLORIDE SERPL-SCNC: 104 MMOL/L (ref 96–106)
CO2 SERPL-SCNC: 23 MMOL/L (ref 20–29)
CREAT SERPL-MCNC: 1.36 MG/DL (ref 0.76–1.27)
GLUCOSE SERPL-MCNC: 98 MG/DL (ref 65–99)
INTERPRETATION: NORMAL
POTASSIUM SERPL-SCNC: 4.7 MMOL/L (ref 3.5–5.2)
SODIUM SERPL-SCNC: 143 MMOL/L (ref 134–144)

## 2020-03-18 NOTE — PROGRESS NOTES
Kidney function still a little off, but stable.  Copy to pcp and fup with pcp to see if any further testing is necessary

## 2020-08-20 ENCOUNTER — TELEPHONE (OUTPATIENT)
Dept: CARDIOLOGY CLINIC | Age: 75
End: 2020-08-20

## 2020-08-20 NOTE — TELEPHONE ENCOUNTER
Left message for patient that 8/31/20 appointment has been cancelled and to call back and reschedule. Please schedule in Dr. Marilin Barbour next available for virtual visit or in office.

## 2020-09-24 ENCOUNTER — HOSPITAL ENCOUNTER (OUTPATIENT)
Dept: LAB | Age: 75
Discharge: HOME | End: 2020-09-24
Payer: MEDICARE

## 2020-09-24 PROCEDURE — 36415 COLL VENOUS BLD VENIPUNCTURE: CPT

## 2020-09-24 PROCEDURE — 80053 COMPREHEN METABOLIC PANEL: CPT

## 2020-09-24 PROCEDURE — 80061 LIPID PANEL: CPT

## 2020-09-25 ENCOUNTER — TELEPHONE (OUTPATIENT)
Dept: CARDIOLOGY CLINIC | Age: 75
End: 2020-09-25

## 2020-09-25 DIAGNOSIS — E78.2 MIXED HYPERLIPIDEMIA: Primary | ICD-10-CM

## 2020-09-25 LAB
ALBUMIN SERPL-MCNC: 4.2 G/DL (ref 3.7–4.7)
ALBUMIN/GLOB SERPL: 1.8 {RATIO} (ref 1.2–2.2)
ALP SERPL-CCNC: 68 IU/L (ref 39–117)
ALT SERPL-CCNC: 19 IU/L (ref 0–44)
AST SERPL-CCNC: 24 IU/L (ref 0–40)
BILIRUB SERPL-MCNC: 1 MG/DL (ref 0–1.2)
BUN SERPL-MCNC: 21 MG/DL (ref 8–27)
BUN/CREAT SERPL: 16 (ref 10–24)
CALCIUM SERPL-MCNC: 9.4 MG/DL (ref 8.6–10.2)
CHLORIDE SERPL-SCNC: 105 MMOL/L (ref 96–106)
CHOLEST SERPL-MCNC: 119 MG/DL (ref 100–199)
CO2 SERPL-SCNC: 25 MMOL/L (ref 20–29)
CREAT SERPL-MCNC: 1.32 MG/DL (ref 0.76–1.27)
GLOBULIN SER CALC-MCNC: 2.4 G/DL (ref 1.5–4.5)
GLUCOSE SERPL-MCNC: 94 MG/DL (ref 65–99)
HDLC SERPL-MCNC: 28 MG/DL
INTERPRETATION, 910389: NORMAL
INTERPRETATION: NORMAL
LDLC SERPL CALC-MCNC: 66 MG/DL (ref 0–99)
PDF IMAGE, 910387: NORMAL
POTASSIUM SERPL-SCNC: 4.9 MMOL/L (ref 3.5–5.2)
PROT SERPL-MCNC: 6.6 G/DL (ref 6–8.5)
SODIUM SERPL-SCNC: 142 MMOL/L (ref 134–144)
TRIGL SERPL-MCNC: 143 MG/DL (ref 0–149)
VLDLC SERPL CALC-MCNC: 25 MG/DL (ref 5–40)

## 2020-09-25 NOTE — TELEPHONE ENCOUNTER
----- Message from [address] [facility] [address], [place] sent at 9/25/2020  7:38 AM EDT -----  Looks great.  Stay on meds and repeat 6mos

## 2020-10-02 ENCOUNTER — OFFICE VISIT (OUTPATIENT)
Dept: CARDIOLOGY CLINIC | Age: 75
End: 2020-10-02
Payer: MEDICARE

## 2020-10-02 VITALS
BODY MASS INDEX: 29.43 KG/M2 | DIASTOLIC BLOOD PRESSURE: 80 MMHG | SYSTOLIC BLOOD PRESSURE: 130 MMHG | HEIGHT: 70 IN | RESPIRATION RATE: 15 BRPM | OXYGEN SATURATION: 98 % | WEIGHT: 205.6 LBS | HEART RATE: 60 BPM

## 2020-10-02 DIAGNOSIS — E78.2 MIXED HYPERLIPIDEMIA: ICD-10-CM

## 2020-10-02 DIAGNOSIS — I25.10 CORONARY ARTERY DISEASE INVOLVING NATIVE CORONARY ARTERY OF NATIVE HEART WITHOUT ANGINA PECTORIS: Primary | ICD-10-CM

## 2020-10-02 DIAGNOSIS — I10 ESSENTIAL HYPERTENSION: ICD-10-CM

## 2020-10-02 PROCEDURE — G8536 NO DOC ELDER MAL SCRN: HCPCS | Performed by: SPECIALIST

## 2020-10-02 PROCEDURE — G8754 DIAS BP LESS 90: HCPCS | Performed by: SPECIALIST

## 2020-10-02 PROCEDURE — G0463 HOSPITAL OUTPT CLINIC VISIT: HCPCS | Performed by: SPECIALIST

## 2020-10-02 PROCEDURE — G8432 DEP SCR NOT DOC, RNG: HCPCS | Performed by: SPECIALIST

## 2020-10-02 PROCEDURE — 99213 OFFICE O/P EST LOW 20 MIN: CPT | Performed by: SPECIALIST

## 2020-10-02 PROCEDURE — 1101F PT FALLS ASSESS-DOCD LE1/YR: CPT | Performed by: SPECIALIST

## 2020-10-02 PROCEDURE — G8752 SYS BP LESS 140: HCPCS | Performed by: SPECIALIST

## 2020-10-02 PROCEDURE — G8419 CALC BMI OUT NRM PARAM NOF/U: HCPCS | Performed by: SPECIALIST

## 2020-10-02 PROCEDURE — 3017F COLORECTAL CA SCREEN DOC REV: CPT | Performed by: SPECIALIST

## 2020-10-02 PROCEDURE — G8427 DOCREV CUR MEDS BY ELIG CLIN: HCPCS | Performed by: SPECIALIST

## 2020-10-02 NOTE — PROGRESS NOTES
HISTORY OF PRESENT ILLNESS  Viktor Skinner is a 69 y.o. male     SUMMARY:   Problem List  Date Reviewed: 10/2/2020          Codes Class Noted    CAD (coronary artery disease) ICD-10-CM: I25.10  ICD-9-CM: 414.00  9/13/2016    Overview Signed 11/8/2016  7:37 AM by Lila Rossi MD     9/16 stress echo positive with findings consistent with left main or multivessel disease             Postoperative anemia due to acute blood loss ICD-10-CM: D62  ICD-9-CM: 285.1  9/16/2016        S/P CABG x 3 ICD-10-CM: Z95.1  ICD-9-CM: V45.81  9/15/2016    Overview Signed 9/15/2016 11:20 AM by KATHYA oRllins     CORONARY ARTERY BYPASS GRAFT x3, LIMA to LAD. RSVG to Dx. RSVG to PDA  Right endoscopic greater saphenous vein harvest               SOB (shortness of breath) ICD-10-CM: R06.02  ICD-9-CM: 786.05  8/31/2016        Precordial pain ICD-10-CM: R07.2  ICD-9-CM: 786.51  8/31/2016    Overview Signed 8/31/2016  4:43 PM by Huma Díaz MD     11/02 [facility], stress echo, ?distal lateral wall infarct with improved wall motion following exercise             HTN (hypertension) ICD-10-CM: I10  ICD-9-CM: 401.9  8/31/2016        Mixed hyperlipidemia ICD-10-CM: E78.2  ICD-9-CM: 272.2  8/31/2016              Current Outpatient Medications on File Prior to Visit   Medication Sig    nitroglycerin (NITROSTAT) 0.4 mg SL tablet 1 Tab by SubLINGual route every five (5) minutes as needed for Chest Pain (Max of 3 doses then call 911).  psyllium (METAMUCIL) packet Take 1 Packet by mouth as needed.  tamsulosin (FLOMAX) 0.4 mg capsule Take 0.4 mg by mouth daily.  polyethylene glycol (MIRALAX) 17 gram/dose powder Take 17 g by mouth as needed.  metoprolol succinate (TOPROL XL) 100 mg tablet Take 1 Tab by mouth daily.  oxyCODONE-acetaminophen (PERCOCET) 5-325 mg per tablet Take 1 Tab by mouth every six (6) hours as needed for Pain. Max Daily Amount: 4 Tabs.     fluticasone (FLONASE) 50 mcg/actuation nasal spray 2 Sprays by Both Nostrils route as needed.  allopurinol (ZYLOPRIM) 300 mg tablet Take 300 mg by mouth daily.  fenofibrate nanocrystallized (TRICOR) 145 mg tablet Take 145 mg by mouth daily.  atorvastatin (LIPITOR) 20 mg tablet Take 20 mg by mouth daily.  potassium citrate (UROCIT-K10) 10 mEq (1,080 mg) TbER Take 10 mEq by mouth daily.  aspirin delayed-release 81 mg tablet Take 81 mg by mouth daily.  cholecalciferol (VITAMIN D3) 1,000 unit cap Take 1,000 Units by mouth daily. No current facility-administered medications on file prior to visit. CARDIOLOGY STUDIES TO DATE:  9/16 stress echo positive with findings consistent with left main or multivessel disease    Chief Complaint   Patient presents with    Follow-up     HPI :  He is doing well. He is walking every day and feels good. Weight is stable and no problems with his medications. Blood pressures well controlled and his recent lipid profile looked great except for a low HDL. CARDIAC ROS:   negative for chest pain, dyspnea, palpitations, syncope, orthopnea, paroxysmal nocturnal dyspnea, exertional chest pressure/discomfort, claudication, lower extremity edema    Family History   Problem Relation Age of Onset    Cancer Father         lung    Heart Disease Neg Hx        Past Medical History:   Diagnosis Date    CAD (coronary artery disease) 09/15/2016    CABG x3    HTN (hypertension)     Hyperlipidemia     Kidney stones     Polio        GENERAL ROS:  A comprehensive review of systems was negative except for that written in the HPI.     Visit Vitals  /80 (BP 1 Location: Left arm, BP Patient Position: Sitting)   Pulse 60   Resp 15   Ht 5' 10\" (1.778 m)   Wt 205 lb 9.6 oz (93.3 kg)   SpO2 98%   BMI 29.50 kg/m²       Wt Readings from Last 3 Encounters:   10/02/20 205 lb 9.6 oz (93.3 kg)   03/02/20 205 lb (93 kg)   08/29/19 199 lb 12.8 oz (90.6 kg)            BP Readings from Last 3 Encounters:   10/02/20 130/80   03/02/20 120/70   08/29/19 138/80       PHYSICAL EXAM  General appearance: alert, cooperative, no distress, appears stated age  Neurologic: Alert and oriented X 3  Neck: supple, symmetrical, trachea midline, no adenopathy, no carotid bruit and no JVD  Lungs: clear to auscultation bilaterally  Heart: regular rate and rhythm, S1, S2 normal, no murmur, click, rub or gallop  Extremities: extremities normal, atraumatic, no cyanosis or edema    Lab Results   Component Value Date/Time    Cholesterol, total 119 09/24/2020 08:16 AM    Cholesterol, total 119 02/20/2020 07:39 AM    Cholesterol, total 106 08/21/2019 08:10 AM    Cholesterol, total 120 02/19/2019 07:56 AM    Cholesterol, total 119 08/15/2018 08:07 AM    HDL Cholesterol 28 (L) 09/24/2020 08:16 AM    HDL Cholesterol 26 (L) 02/20/2020 07:39 AM    HDL Cholesterol 28 (L) 08/21/2019 08:10 AM    HDL Cholesterol 27 (L) 02/19/2019 07:56 AM    HDL Cholesterol 30 (L) 08/15/2018 08:07 AM    LDL, calculated 62 02/20/2020 07:39 AM    LDL, calculated 53 08/21/2019 08:10 AM    LDL, calculated 66 02/19/2019 07:56 AM    LDL, calculated 66 08/15/2018 08:07 AM    LDL, calculated 64 02/01/2018 08:22 AM    LDL Chol Calc (NIH) 66 09/24/2020 08:16 AM    Triglyceride 143 09/24/2020 08:16 AM    Triglyceride 156 (H) 02/20/2020 07:39 AM    Triglyceride 126 08/21/2019 08:10 AM    Triglyceride 137 02/19/2019 07:56 AM    Triglyceride 117 08/15/2018 08:07 AM     ASSESSMENT :      He is stable and asymptomatic, well compensated on a good medical regimen and needs no cardiac testing at this time. He already has a lab slip for follow-up blood work. current treatment plan is effective, no change in therapy  lab results and schedule of future lab studies reviewed with patient  reviewed diet, exercise and weight control    Encounter Diagnoses   Name Primary?     Coronary artery disease involving native coronary artery of native heart without angina pectoris Yes    Essential hypertension     Mixed hyperlipidemia      No orders of the defined types were placed in this encounter. Follow-up and Dispositions    · Return in about 6 months (around 4/2/2021). Sreedhar Emerson MD  10/2/2020  Please note that this dictation was completed with [employer], the computer voice recognition software. Quite often unanticipated grammatical, syntax, homophones, and other interpretive errors are inadvertently transcribed by the computer software. Please disregard these errors. Please excuse any errors that have escaped final proofreading. Thank you.

## 2021-03-26 LAB
ALBUMIN SERPL-MCNC: 4.4 G/DL (ref 3.7–4.7)
ALBUMIN/GLOB SERPL: 2 {RATIO} (ref 1.2–2.2)
ALP SERPL-CCNC: 72 IU/L (ref 39–117)
ALT SERPL-CCNC: 15 IU/L (ref 0–44)
AST SERPL-CCNC: 21 IU/L (ref 0–40)
BILIRUB SERPL-MCNC: 0.7 MG/DL (ref 0–1.2)
BUN SERPL-MCNC: 23 MG/DL (ref 8–27)
BUN/CREAT SERPL: 16 (ref 10–24)
CALCIUM SERPL-MCNC: 9.4 MG/DL (ref 8.6–10.2)
CHLORIDE SERPL-SCNC: 106 MMOL/L (ref 96–106)
CHOLEST SERPL-MCNC: 121 MG/DL (ref 100–199)
CO2 SERPL-SCNC: 25 MMOL/L (ref 20–29)
CREAT SERPL-MCNC: 1.42 MG/DL (ref 0.76–1.27)
GLOBULIN SER CALC-MCNC: 2.2 G/DL (ref 1.5–4.5)
GLUCOSE SERPL-MCNC: 94 MG/DL (ref 65–99)
HDLC SERPL-MCNC: 29 MG/DL
IMP & REVIEW OF LAB RESULTS: NORMAL
INTERPRETATION: NORMAL
LDLC SERPL CALC-MCNC: 68 MG/DL (ref 0–99)
PDF IMAGE, 910387: NORMAL
POTASSIUM SERPL-SCNC: 5.3 MMOL/L (ref 3.5–5.2)
PROT SERPL-MCNC: 6.6 G/DL (ref 6–8.5)
SODIUM SERPL-SCNC: 144 MMOL/L (ref 134–144)
TRIGL SERPL-MCNC: 137 MG/DL (ref 0–149)
VLDLC SERPL CALC-MCNC: 24 MG/DL (ref 5–40)

## 2021-03-26 NOTE — TELEPHONE ENCOUNTER
Chol is great. Potassium slightly elevated and kidney function is a little low. Send info on low potassium diet.  Copy labs to pcp and he should fup with them regarding kidneys

## 2021-04-02 ENCOUNTER — OFFICE VISIT (OUTPATIENT)
Dept: CARDIOLOGY CLINIC | Age: 76
End: 2021-04-02
Payer: MEDICARE

## 2021-04-02 VITALS
WEIGHT: 206 LBS | DIASTOLIC BLOOD PRESSURE: 70 MMHG | RESPIRATION RATE: 13 BRPM | BODY MASS INDEX: 29.49 KG/M2 | HEART RATE: 66 BPM | OXYGEN SATURATION: 98 % | HEIGHT: 70 IN | SYSTOLIC BLOOD PRESSURE: 120 MMHG

## 2021-04-02 DIAGNOSIS — R06.02 SOB (SHORTNESS OF BREATH): ICD-10-CM

## 2021-04-02 DIAGNOSIS — E78.2 MIXED HYPERLIPIDEMIA: ICD-10-CM

## 2021-04-02 DIAGNOSIS — I10 ESSENTIAL HYPERTENSION: ICD-10-CM

## 2021-04-02 DIAGNOSIS — I25.10 CORONARY ARTERY DISEASE INVOLVING NATIVE CORONARY ARTERY OF NATIVE HEART WITHOUT ANGINA PECTORIS: Primary | ICD-10-CM

## 2021-04-02 PROCEDURE — G8536 NO DOC ELDER MAL SCRN: HCPCS | Performed by: SPECIALIST

## 2021-04-02 PROCEDURE — G8419 CALC BMI OUT NRM PARAM NOF/U: HCPCS | Performed by: SPECIALIST

## 2021-04-02 PROCEDURE — G8752 SYS BP LESS 140: HCPCS | Performed by: SPECIALIST

## 2021-04-02 PROCEDURE — G8427 DOCREV CUR MEDS BY ELIG CLIN: HCPCS | Performed by: SPECIALIST

## 2021-04-02 PROCEDURE — 1101F PT FALLS ASSESS-DOCD LE1/YR: CPT | Performed by: SPECIALIST

## 2021-04-02 PROCEDURE — G8754 DIAS BP LESS 90: HCPCS | Performed by: SPECIALIST

## 2021-04-02 PROCEDURE — 3017F COLORECTAL CA SCREEN DOC REV: CPT | Performed by: SPECIALIST

## 2021-04-02 PROCEDURE — 99213 OFFICE O/P EST LOW 20 MIN: CPT | Performed by: SPECIALIST

## 2021-04-02 PROCEDURE — G8510 SCR DEP NEG, NO PLAN REQD: HCPCS | Performed by: SPECIALIST

## 2021-04-02 PROCEDURE — G0463 HOSPITAL OUTPT CLINIC VISIT: HCPCS | Performed by: SPECIALIST

## 2021-04-02 RX ORDER — NITROGLYCERIN 0.4 MG/1
0.4 TABLET SUBLINGUAL
Qty: 1 BOTTLE | Refills: 1 | Status: SHIPPED | OUTPATIENT
Start: 2021-04-02

## 2021-04-02 NOTE — PROGRESS NOTES
HISTORY OF PRESENT ILLNESS  Nandini Crum is a 70 y.o. male     SUMMARY:   Problem List  Date Reviewed: 4/2/2021          Codes Class Noted    CAD (coronary artery disease) ICD-10-CM: I25.10  ICD-9-CM: 414.00  9/13/2016    Overview Signed 11/8/2016  7:37 AM by Nela Lau MD     9/16 stress echo positive with findings consistent with left main or multivessel disease             Postoperative anemia due to acute blood loss ICD-10-CM: D62  ICD-9-CM: 285.1  9/16/2016        S/P CABG x 3 ICD-10-CM: Z95.1  ICD-9-CM: V45.81  9/15/2016    Overview Signed 9/15/2016 11:20 AM by KATHYA Vila     CORONARY ARTERY BYPASS GRAFT x3, LIMA to LAD. RSVG to Dx. RSVG to PDA  Right endoscopic greater saphenous vein harvest               SOB (shortness of breath) ICD-10-CM: R06.02  ICD-9-CM: 786.05  8/31/2016        Precordial pain ICD-10-CM: R07.2  ICD-9-CM: 786.51  8/31/2016    Overview Signed 8/31/2016  4:43 PM by Steven Faith MD     11/02 [facility], stress echo, ?distal lateral wall infarct with improved wall motion following exercise             HTN (hypertension) ICD-10-CM: I10  ICD-9-CM: 401.9  8/31/2016        Mixed hyperlipidemia ICD-10-CM: E78.2  ICD-9-CM: 272.2  8/31/2016              Current Outpatient Medications on File Prior to Visit   Medication Sig    nitroglycerin (NITROSTAT) 0.4 mg SL tablet 1 Tab by SubLINGual route every five (5) minutes as needed for Chest Pain (Max of 3 doses then call 911).  psyllium (METAMUCIL) packet Take 1 Packet by mouth as needed.  tamsulosin (FLOMAX) 0.4 mg capsule Take 0.4 mg by mouth daily.  polyethylene glycol (MIRALAX) 17 gram/dose powder Take 17 g by mouth as needed.  metoprolol succinate (TOPROL XL) 100 mg tablet Take 1 Tab by mouth daily.  oxyCODONE-acetaminophen (PERCOCET) 5-325 mg per tablet Take 1 Tab by mouth every six (6) hours as needed for Pain. Max Daily Amount: 4 Tabs.     fluticasone (FLONASE) 50 mcg/actuation nasal spray 2 Sprays by Both Nostrils route as needed.  allopurinol (ZYLOPRIM) 300 mg tablet Take 300 mg by mouth daily.  fenofibrate nanocrystallized (TRICOR) 145 mg tablet Take 145 mg by mouth daily.  atorvastatin (LIPITOR) 20 mg tablet Take 20 mg by mouth daily.  potassium citrate (UROCIT-K10) 10 mEq (1,080 mg) TbER Take 10 mEq by mouth daily.  aspirin delayed-release 81 mg tablet Take 81 mg by mouth daily.  cholecalciferol (VITAMIN D3) 1,000 unit cap Take 1,000 Units by mouth daily. No current facility-administered medications on file prior to visit. CARDIOLOGY STUDIES TO DATE:  9/16 stress echo positive with findings consistent with left main or multivessel disease    Chief Complaint   Patient presents with    Follow-up     HPI :  He is doing great. Still walking every day and his weight is holding steady. Blood pressure is well controlled and recent lipid profile looked excellent. His creatinine and potassium were slightly elevated. We sent him information on high potassium foods and he is can make some changes there and follow-up with his PCP regarding his creatinine. CARDIAC ROS:   negative for chest pain, dyspnea, palpitations, syncope, orthopnea, paroxysmal nocturnal dyspnea, exertional chest pressure/discomfort, claudication, lower extremity edema    Family History   Problem Relation Age of Onset    Cancer Father         lung    Heart Disease Neg Hx        Past Medical History:   Diagnosis Date    CAD (coronary artery disease) 09/15/2016    CABG x3    HTN (hypertension)     Hyperlipidemia     Kidney stones     Polio        GENERAL ROS:  A comprehensive review of systems was negative except for that written in the HPI.     Visit Vitals  /70 (BP 1 Location: Left arm, BP Patient Position: Sitting, BP Cuff Size: Adult)   Pulse 66   Resp 13   Ht 5' 10\" (1.778 m)   Wt 206 lb (93.4 kg)   SpO2 98%   BMI 29.56 kg/m²       Wt Readings from Last 3 Encounters:   04/02/21 206 lb (93.4 kg)   10/02/20 205 lb 9.6 oz (93.3 kg)   03/02/20 205 lb (93 kg)            BP Readings from Last 3 Encounters:   04/02/21 120/70   10/02/20 130/80   03/02/20 120/70       PHYSICAL EXAM  General appearance: alert, cooperative, no distress, appears stated age  Neurologic: Alert and oriented X 3  Neck: supple, symmetrical, trachea midline, no adenopathy, no carotid bruit and no JVD  Lungs: clear to auscultation bilaterally  Heart: regular rate and rhythm, S1, S2 normal, no murmur, click, rub or gallop  Extremities: extremities normal, atraumatic, no cyanosis or edema    Lab Results   Component Value Date/Time    Cholesterol, total 121 03/25/2021 08:03 AM    Cholesterol, total 119 09/24/2020 08:16 AM    Cholesterol, total 119 02/20/2020 07:39 AM    Cholesterol, total 106 08/21/2019 08:10 AM    Cholesterol, total 120 02/19/2019 07:56 AM    HDL Cholesterol 29 (L) 03/25/2021 08:03 AM    HDL Cholesterol 28 (L) 09/24/2020 08:16 AM    HDL Cholesterol 26 (L) 02/20/2020 07:39 AM    HDL Cholesterol 28 (L) 08/21/2019 08:10 AM    HDL Cholesterol 27 (L) 02/19/2019 07:56 AM    LDL, calculated 68 03/25/2021 08:03 AM    LDL, calculated 66 09/24/2020 08:16 AM    LDL, calculated 62 02/20/2020 07:39 AM    LDL, calculated 53 08/21/2019 08:10 AM    LDL, calculated 66 02/19/2019 07:56 AM    LDL, calculated 66 08/15/2018 08:07 AM    LDL, calculated 64 02/01/2018 08:22 AM    Triglyceride 137 03/25/2021 08:03 AM    Triglyceride 143 09/24/2020 08:16 AM    Triglyceride 156 (H) 02/20/2020 07:39 AM    Triglyceride 126 08/21/2019 08:10 AM    Triglyceride 137 02/19/2019 07:56 AM     ASSESSMENT :      He is stable and asymptomatic, well compensated on a good medical regimen and needs no cardiac testing at this time. current treatment plan is effective, no change in therapy  lab results and schedule of future lab studies reviewed with patient  reviewed diet, exercise and weight control    Encounter Diagnoses   Name Primary?     Coronary artery disease involving native coronary artery of native heart without angina pectoris Yes    Essential hypertension     Mixed hyperlipidemia      No orders of the defined types were placed in this encounter. Follow-up and Dispositions    · Return in about 6 months (around 10/2/2021). Blessing Colvin MD  4/2/2021  Please note that this dictation was completed with [employer], the computer voice recognition software. Quite often unanticipated grammatical, syntax, homophones, and other interpretive errors are inadvertently transcribed by the computer software. Please disregard these errors. Please excuse any errors that have escaped final proofreading. Thank you.

## 2021-04-23 ENCOUNTER — TELEPHONE (OUTPATIENT)
Dept: CARDIOLOGY CLINIC | Age: 76
End: 2021-04-23

## 2021-04-23 NOTE — TELEPHONE ENCOUNTER
Patients nurse calling to get cardiac clearance for surgery sometime in May, please contact Jethro at Dr. Louise Co office, [phone].

## 2021-04-23 NOTE — TELEPHONE ENCOUNTER
Called [facility]. Patient is not scheduled yet, but needs cardiac clearance for melanoma surgery. Clearance to include him holding aspirin. Please advise if okay and how many days he can hold. Thanks. Fax # [phone].

## 2021-11-03 ENCOUNTER — OFFICE VISIT (OUTPATIENT)
Dept: CARDIOLOGY CLINIC | Age: 76
End: 2021-11-03
Payer: MEDICARE

## 2021-11-03 VITALS
WEIGHT: 201.4 LBS | RESPIRATION RATE: 14 BRPM | SYSTOLIC BLOOD PRESSURE: 120 MMHG | HEIGHT: 70 IN | OXYGEN SATURATION: 97 % | BODY MASS INDEX: 28.83 KG/M2 | DIASTOLIC BLOOD PRESSURE: 70 MMHG | HEART RATE: 60 BPM

## 2021-11-03 DIAGNOSIS — E78.2 MIXED HYPERLIPIDEMIA: ICD-10-CM

## 2021-11-03 DIAGNOSIS — I25.10 CORONARY ARTERY DISEASE INVOLVING NATIVE CORONARY ARTERY OF NATIVE HEART WITHOUT ANGINA PECTORIS: Primary | ICD-10-CM

## 2021-11-03 DIAGNOSIS — I10 PRIMARY HYPERTENSION: ICD-10-CM

## 2021-11-03 PROCEDURE — G8510 SCR DEP NEG, NO PLAN REQD: HCPCS | Performed by: SPECIALIST

## 2021-11-03 PROCEDURE — 3017F COLORECTAL CA SCREEN DOC REV: CPT | Performed by: SPECIALIST

## 2021-11-03 PROCEDURE — G0463 HOSPITAL OUTPT CLINIC VISIT: HCPCS | Performed by: SPECIALIST

## 2021-11-03 PROCEDURE — G8754 DIAS BP LESS 90: HCPCS | Performed by: SPECIALIST

## 2021-11-03 PROCEDURE — 99213 OFFICE O/P EST LOW 20 MIN: CPT | Performed by: SPECIALIST

## 2021-11-03 PROCEDURE — G8752 SYS BP LESS 140: HCPCS | Performed by: SPECIALIST

## 2021-11-03 PROCEDURE — G8427 DOCREV CUR MEDS BY ELIG CLIN: HCPCS | Performed by: SPECIALIST

## 2021-11-03 PROCEDURE — 1101F PT FALLS ASSESS-DOCD LE1/YR: CPT | Performed by: SPECIALIST

## 2021-11-03 PROCEDURE — G8419 CALC BMI OUT NRM PARAM NOF/U: HCPCS | Performed by: SPECIALIST

## 2021-11-03 PROCEDURE — G8536 NO DOC ELDER MAL SCRN: HCPCS | Performed by: SPECIALIST

## 2021-11-03 NOTE — PROGRESS NOTES
HISTORY OF PRESENT ILLNESS  Carey Hein is a 70 y.o. male     SUMMARY:   Problem List  Date Reviewed: 11/3/2021          Codes Class Noted    CAD (coronary artery disease) ICD-10-CM: I25.10  ICD-9-CM: 414.00  9/13/2016    Overview Signed 11/8/2016  7:37 AM by Adam Moise MD     9/16 stress echo positive with findings consistent with left main or multivessel disease             Postoperative anemia due to acute blood loss ICD-10-CM: D62  ICD-9-CM: 285.1  9/16/2016        S/P CABG x 3 ICD-10-CM: Z95.1  ICD-9-CM: V45.81  9/15/2016    Overview Signed 9/15/2016 11:20 AM by KATHYA White     CORONARY ARTERY BYPASS GRAFT x3, LIMA to LAD. RSVG to Dx. RSVG to PDA  Right endoscopic greater saphenous vein harvest               SOB (shortness of breath) ICD-10-CM: R06.02  ICD-9-CM: 786.05  8/31/2016        Precordial pain ICD-10-CM: R07.2  ICD-9-CM: 786.51  8/31/2016    Overview Signed 8/31/2016  4:43 PM by Sunny Moore MD     11/02 [facility], stress echo, ?distal lateral wall infarct with improved wall motion following exercise             HTN (hypertension) ICD-10-CM: I10  ICD-9-CM: 401.9  8/31/2016        Mixed hyperlipidemia ICD-10-CM: E78.2  ICD-9-CM: 272.2  8/31/2016              Current Outpatient Medications on File Prior to Visit   Medication Sig    nitroglycerin (NITROSTAT) 0.4 mg SL tablet 1 Tab by SubLINGual route every five (5) minutes as needed for Chest Pain (Max of 3 doses then call 911).  psyllium (METAMUCIL) packet Take 1 Packet by mouth as needed.  tamsulosin (FLOMAX) 0.4 mg capsule Take 0.4 mg by mouth daily.  polyethylene glycol (MIRALAX) 17 gram/dose powder Take 17 g by mouth as needed.  metoprolol succinate (TOPROL XL) 100 mg tablet Take 1 Tab by mouth daily.  oxyCODONE-acetaminophen (PERCOCET) 5-325 mg per tablet Take 1 Tab by mouth every six (6) hours as needed for Pain. Max Daily Amount: 4 Tabs.     fluticasone (FLONASE) 50 mcg/actuation nasal spray 2 Sprays by Both Nostrils route as needed.  allopurinol (ZYLOPRIM) 300 mg tablet Take 300 mg by mouth daily.  fenofibrate nanocrystallized (TRICOR) 145 mg tablet Take 145 mg by mouth daily.  atorvastatin (LIPITOR) 20 mg tablet Take 20 mg by mouth daily.  potassium citrate (UROCIT-K10) 10 mEq (1,080 mg) TbER Take 10 mEq by mouth daily.  aspirin delayed-release 81 mg tablet Take 81 mg by mouth daily.  cholecalciferol (VITAMIN D3) 1,000 unit cap Take 1,000 Units by mouth daily. No current facility-administered medications on file prior to visit. CARDIOLOGY STUDIES TO DATE:  9/16 stress echo positive with findings consistent with left main or multivessel disease    Chief Complaint   Patient presents with    Follow-up     HPI :  He is doing great and remains asymptomatic from a cardiac standpoint. Blood pressures well controlled and his weight is actually down few pounds. He had some major surgery on his leg for skin cancer so he has not been walking as much as he had been in the past.  Recent lipid profile looked great  CARDIAC ROS:   negative for chest pain, dyspnea, palpitations, syncope, orthopnea, paroxysmal nocturnal dyspnea, exertional chest pressure/discomfort, claudication, lower extremity edema    Family History   Problem Relation Age of Onset    Cancer Father         lung    Heart Disease Neg Hx        Past Medical History:   Diagnosis Date    CAD (coronary artery disease) 09/15/2016    CABG x3    HTN (hypertension)     Hyperlipidemia     Kidney stones     Polio        GENERAL ROS:  A comprehensive review of systems was negative except for that written in the HPI.     Visit Vitals  /70 (BP 1 Location: Left arm, BP Patient Position: Sitting, BP Cuff Size: Adult)   Pulse 60   Resp 14   Ht 5' 10\" (1.778 m)   Wt 201 lb 6.4 oz (91.4 kg)   SpO2 97%   BMI 28.90 kg/m²       Wt Readings from Last 3 Encounters:   11/03/21 201 lb 6.4 oz (91.4 kg)   04/02/21 206 lb (93.4 kg) 10/02/20 205 lb 9.6 oz (93.3 kg)            BP Readings from Last 3 Encounters:   11/03/21 120/70   04/02/21 120/70   10/02/20 130/80       PHYSICAL EXAM  General appearance: alert, cooperative, no distress, appears stated age  Neurologic: Alert and oriented X 3  Neck: supple, symmetrical, trachea midline, no adenopathy, no carotid bruit and no JVD  Lungs: clear to auscultation bilaterally  Heart: regular rate and rhythm, S1, S2 normal, no murmur, click, rub or gallop  Extremities: extremities normal, atraumatic, no cyanosis or edema    Lab Results   Component Value Date/Time    Cholesterol, total 122 10/27/2021 08:31 AM    Cholesterol, total 121 03/25/2021 08:03 AM    Cholesterol, total 119 09/24/2020 08:16 AM    Cholesterol, total 119 02/20/2020 07:39 AM    Cholesterol, total 106 08/21/2019 08:10 AM    HDL Cholesterol 33 10/27/2021 08:31 AM    HDL Cholesterol 29 (L) 03/25/2021 08:03 AM    HDL Cholesterol 28 (L) 09/24/2020 08:16 AM    HDL Cholesterol 26 (L) 02/20/2020 07:39 AM    HDL Cholesterol 28 (L) 08/21/2019 08:10 AM    LDL, calculated 47.8 10/27/2021 08:31 AM    LDL, calculated 68 03/25/2021 08:03 AM    LDL, calculated 66 09/24/2020 08:16 AM    LDL, calculated 62 02/20/2020 07:39 AM    LDL, calculated 53 08/21/2019 08:10 AM    LDL, calculated 66 02/19/2019 07:56 AM    LDL, calculated 66 08/15/2018 08:07 AM    Triglyceride 206 (H) 10/27/2021 08:31 AM    Triglyceride 137 03/25/2021 08:03 AM    Triglyceride 143 09/24/2020 08:16 AM    Triglyceride 156 (H) 02/20/2020 07:39 AM    Triglyceride 126 08/21/2019 08:10 AM    CHOL/HDL Ratio 3.7 10/27/2021 08:31 AM     ASSESSMENT :      He is stable and asymptomatic, well compensated on a good medical regimen and needs no cardiac testing at this time. current treatment plan is effective, no change in therapy  lab results and schedule of future lab studies reviewed with patient  reviewed diet, exercise and weight control    Encounter Diagnoses   Name Primary?     Coronary artery disease involving native coronary artery of native heart without angina pectoris Yes    Primary hypertension     Mixed hyperlipidemia      No orders of the defined types were placed in this encounter. Follow-up and Dispositions    · Return in about 6 months (around 5/3/2022). Ekaterina Eubanks MD  11/3/2021  Please note that this dictation was completed with [employer], the computer voice recognition software. Quite often unanticipated grammatical, syntax, homophones, and other interpretive errors are inadvertently transcribed by the computer software. Please disregard these errors. Please excuse any errors that have escaped final proofreading. Thank you.

## 2022-06-22 DIAGNOSIS — E78.2 MIXED HYPERLIPIDEMIA: Primary | ICD-10-CM

## 2022-06-28 ENCOUNTER — TELEPHONE (OUTPATIENT)
Dept: CARDIOLOGY CLINIC | Age: 77
End: 2022-06-28

## 2022-06-28 NOTE — TELEPHONE ENCOUNTER
Called pt. Requested call to move up time for his follow up scheduled with Dr. Lizeth Randle for tomorrow Wednesday 6/29 at 3:40. Dr. Lizeth Randle would like to move appointment up from 3:40 to 3:00 pm. The 3:00 pm slot is currently blocked for this patient, but okay to use if patient agrees to this time.

## 2022-06-29 ENCOUNTER — OFFICE VISIT (OUTPATIENT)
Dept: CARDIOLOGY CLINIC | Age: 77
End: 2022-06-29
Payer: MEDICARE

## 2022-06-29 VITALS
SYSTOLIC BLOOD PRESSURE: 114 MMHG | HEIGHT: 70 IN | WEIGHT: 203 LBS | RESPIRATION RATE: 16 BRPM | BODY MASS INDEX: 29.06 KG/M2 | DIASTOLIC BLOOD PRESSURE: 60 MMHG | HEART RATE: 88 BPM | OXYGEN SATURATION: 98 %

## 2022-06-29 DIAGNOSIS — Z95.1 S/P CABG X 3: ICD-10-CM

## 2022-06-29 DIAGNOSIS — I10 PRIMARY HYPERTENSION: ICD-10-CM

## 2022-06-29 DIAGNOSIS — I25.10 CORONARY ARTERY DISEASE INVOLVING NATIVE CORONARY ARTERY OF NATIVE HEART WITHOUT ANGINA PECTORIS: Primary | ICD-10-CM

## 2022-06-29 PROCEDURE — 99213 OFFICE O/P EST LOW 20 MIN: CPT | Performed by: SPECIALIST

## 2022-06-29 PROCEDURE — G8427 DOCREV CUR MEDS BY ELIG CLIN: HCPCS | Performed by: SPECIALIST

## 2022-06-29 PROCEDURE — G8752 SYS BP LESS 140: HCPCS | Performed by: SPECIALIST

## 2022-06-29 PROCEDURE — 3017F COLORECTAL CA SCREEN DOC REV: CPT | Performed by: SPECIALIST

## 2022-06-29 PROCEDURE — G0463 HOSPITAL OUTPT CLINIC VISIT: HCPCS | Performed by: SPECIALIST

## 2022-06-29 PROCEDURE — G8754 DIAS BP LESS 90: HCPCS | Performed by: SPECIALIST

## 2022-06-29 PROCEDURE — 1101F PT FALLS ASSESS-DOCD LE1/YR: CPT | Performed by: SPECIALIST

## 2022-06-29 PROCEDURE — G8417 CALC BMI ABV UP PARAM F/U: HCPCS | Performed by: SPECIALIST

## 2022-06-29 PROCEDURE — G8536 NO DOC ELDER MAL SCRN: HCPCS | Performed by: SPECIALIST

## 2022-06-29 PROCEDURE — G8432 DEP SCR NOT DOC, RNG: HCPCS | Performed by: SPECIALIST

## 2022-06-29 PROCEDURE — 1123F ACP DISCUSS/DSCN MKR DOCD: CPT | Performed by: SPECIALIST

## 2022-06-29 NOTE — PROGRESS NOTES
HISTORY OF PRESENT ILLNESS  Keyonna Arrieta is a 78 y.o. male     SUMMARY:   Problem List  Date Reviewed: 6/29/2022          Codes Class Noted    CAD (coronary artery disease) ICD-10-CM: I25.10  ICD-9-CM: 414.00  9/13/2016    Overview Signed 11/8/2016  7:37 AM by Janny Palmer MD     9/16 stress echo positive with findings consistent with left main or multivessel disease             Postoperative anemia due to acute blood loss ICD-10-CM: D62  ICD-9-CM: 285.1  9/16/2016        S/P CABG x 3 ICD-10-CM: Z95.1  ICD-9-CM: V45.81  9/15/2016    Overview Signed 9/15/2016 11:20 AM by KATHYA Maldonado     CORONARY ARTERY BYPASS GRAFT x3, LIMA to LAD. RSVG to Dx. RSVG to PDA  Right endoscopic greater saphenous vein harvest               SOB (shortness of breath) ICD-10-CM: R06.02  ICD-9-CM: 786.05  8/31/2016        Precordial pain ICD-10-CM: R07.2  ICD-9-CM: 786.51  8/31/2016    Overview Signed 8/31/2016  4:43 PM by Laurent Manriquez MD     11/02 [facility], stress echo, ?distal lateral wall infarct with improved wall motion following exercise             HTN (hypertension) ICD-10-CM: I10  ICD-9-CM: 401.9  8/31/2016        Mixed hyperlipidemia ICD-10-CM: D75.6  ICD-9-CM: 272.2  8/31/2016              Current Outpatient Medications on File Prior to Visit   Medication Sig    psyllium (METAMUCIL) packet Take 1 Packet by mouth as needed.  tamsulosin (FLOMAX) 0.4 mg capsule Take 0.4 mg by mouth daily.  metoprolol succinate (TOPROL XL) 100 mg tablet Take 1 Tab by mouth daily.  allopurinol (ZYLOPRIM) 300 mg tablet Take 300 mg by mouth daily.  fenofibrate nanocrystallized (TRICOR) 145 mg tablet Take 145 mg by mouth daily.  atorvastatin (LIPITOR) 20 mg tablet Take 20 mg by mouth daily.  aspirin delayed-release 81 mg tablet Take 81 mg by mouth daily.  cholecalciferol (VITAMIN D3) 1,000 unit cap Take 1,000 Units by mouth daily.     nitroglycerin (NITROSTAT) 0.4 mg SL tablet 1 Tab by SubLINGual route every five (5) minutes as needed for Chest Pain (Max of 3 doses then call 911). No current facility-administered medications on file prior to visit. CARDIOLOGY STUDIES TO DATE:  9/16 stress echo positive with findings consistent with left main or multivessel disease    Chief Complaint   Patient presents with    Follow-up     HPI :  He is doing well with no cardiac complaints or problems with his medications. Recent lipids look good except for a low HDL. He has not been exercising like he should and spent a lot of time in [facility] with some friends that have a place near the high school. CARDIAC ROS:   negative for chest pain, dyspnea, palpitations, syncope, orthopnea, paroxysmal nocturnal dyspnea, exertional chest pressure/discomfort, claudication, lower extremity edema    Family History   Problem Relation Age of Onset    Cancer Father         lung    Heart Disease Neg Hx        Past Medical History:   Diagnosis Date    CAD (coronary artery disease) 09/15/2016    CABG x3    HTN (hypertension)     Hyperlipidemia     Kidney stones     Polio        GENERAL ROS:  A comprehensive review of systems was negative except for that written in the HPI.     Visit Vitals  /60   Pulse 88   Resp 16   Ht 5' 10\" (1.778 m)   Wt 203 lb (92.1 kg)   SpO2 98%   BMI 29.13 kg/m²       Wt Readings from Last 3 Encounters:   06/29/22 203 lb (92.1 kg)   11/03/21 201 lb 6.4 oz (91.4 kg)   04/02/21 206 lb (93.4 kg)            BP Readings from Last 3 Encounters:   06/29/22 114/60   11/03/21 120/70   04/02/21 120/70       PHYSICAL EXAM  General appearance: alert, cooperative, no distress, appears stated age  Neurologic: Alert and oriented X 3  Neck: supple, symmetrical, trachea midline, no adenopathy, no carotid bruit and no JVD  Lungs: clear to auscultation bilaterally  Heart: regular rate and rhythm, S1, S2 normal, no murmur, click, rub or gallop  Extremities: extremities normal, atraumatic, no cyanosis or edema    Lab Results   Component Value Date/Time    Cholesterol, total 114 06/22/2022 08:04 AM    Cholesterol, total 122 10/27/2021 08:31 AM    Cholesterol, total 121 03/25/2021 08:03 AM    Cholesterol, total 119 09/24/2020 08:16 AM    Cholesterol, total 119 02/20/2020 07:39 AM    HDL Cholesterol 31 06/22/2022 08:04 AM    HDL Cholesterol 33 10/27/2021 08:31 AM    HDL Cholesterol 29 (L) 03/25/2021 08:03 AM    HDL Cholesterol 28 (L) 09/24/2020 08:16 AM    HDL Cholesterol 26 (L) 02/20/2020 07:39 AM    LDL, calculated 54.4 06/22/2022 08:04 AM    LDL, calculated 47.8 10/27/2021 08:31 AM    LDL, calculated 68 03/25/2021 08:03 AM    LDL, calculated 66 09/24/2020 08:16 AM    LDL, calculated 62 02/20/2020 07:39 AM    LDL, calculated 53 08/21/2019 08:10 AM    LDL, calculated 66 02/19/2019 07:56 AM    Triglyceride 143 06/22/2022 08:04 AM    Triglyceride 206 (H) 10/27/2021 08:31 AM    Triglyceride 137 03/25/2021 08:03 AM    Triglyceride 143 09/24/2020 08:16 AM    Triglyceride 156 (H) 02/20/2020 07:39 AM    CHOL/HDL Ratio 3.7 06/22/2022 08:04 AM    CHOL/HDL Ratio 3.7 10/27/2021 08:31 AM     ASSESSMENT :      He is stable and asymptomatic, well compensated on a good medical regimen and needs no cardiac testing at this time. current treatment plan is effective, no change in therapy  lab results and schedule of future lab studies reviewed with patient  reviewed diet, exercise and weight control    Encounter Diagnoses   Name Primary?  Coronary artery disease involving native coronary artery of native heart without angina pectoris Yes    Primary hypertension     S/P CABG x 3      No orders of the defined types were placed in this encounter. Follow-up and Dispositions    · Return in about 6 months (around 12/29/2022). Marty Dias MD  6/29/2022  Please note that this dictation was completed with [employer], the [employer] voice recognition software.   Quite often unanticipated grammatical, syntax, homophones, and other interpretive errors are inadvertently transcribed by the computer software. Please disregard these errors. Please excuse any errors that have escaped final proofreading. Thank you.

## 2022-06-29 NOTE — TELEPHONE ENCOUNTER
Called pt. ROSE MARIE on  requesting he arrive by 3:00 for appointment today. Asked he call back if unable to come early.

## 2022-12-28 DIAGNOSIS — E78.2 MIXED HYPERLIPIDEMIA: Primary | ICD-10-CM

## 2023-01-04 ENCOUNTER — OFFICE VISIT (OUTPATIENT)
Dept: CARDIOLOGY CLINIC | Age: 78
End: 2023-01-04
Payer: MEDICARE

## 2023-01-04 VITALS
OXYGEN SATURATION: 96 % | HEART RATE: 65 BPM | SYSTOLIC BLOOD PRESSURE: 130 MMHG | BODY MASS INDEX: 28.92 KG/M2 | HEIGHT: 70 IN | WEIGHT: 202 LBS | RESPIRATION RATE: 18 BRPM | DIASTOLIC BLOOD PRESSURE: 72 MMHG

## 2023-01-04 DIAGNOSIS — E78.2 MIXED HYPERLIPIDEMIA: ICD-10-CM

## 2023-01-04 DIAGNOSIS — R06.02 SOB (SHORTNESS OF BREATH): ICD-10-CM

## 2023-01-04 DIAGNOSIS — I10 PRIMARY HYPERTENSION: ICD-10-CM

## 2023-01-04 DIAGNOSIS — I25.10 CORONARY ARTERY DISEASE INVOLVING NATIVE CORONARY ARTERY OF NATIVE HEART WITHOUT ANGINA PECTORIS: Primary | ICD-10-CM

## 2023-01-04 RX ORDER — SODIUM CHLORIDE 50 MG/ML
SOLUTION/ DROPS OPHTHALMIC
COMMUNITY
Start: 2022-12-21

## 2023-01-04 RX ORDER — FLUTICASONE PROPIONATE 50 MCG
SPRAY, SUSPENSION (ML) NASAL
COMMUNITY
Start: 2022-12-10

## 2023-01-04 RX ORDER — CHLORHEXIDINE GLUCONATE 1.2 MG/ML
RINSE ORAL AS NEEDED
COMMUNITY
Start: 2022-11-30

## 2023-01-04 RX ORDER — NITROGLYCERIN 0.4 MG/1
0.4 TABLET SUBLINGUAL
Qty: 1 EACH | Refills: 1 | Status: SHIPPED | OUTPATIENT
Start: 2023-01-04

## 2023-01-04 RX ORDER — ROSUVASTATIN CALCIUM 20 MG/1
TABLET, COATED ORAL
COMMUNITY
Start: 2022-12-21

## 2023-01-04 NOTE — PROGRESS NOTES
HISTORY OF PRESENT ILLNESS  Carey Hein is a 78 y.o. male     SUMMARY:   Problem List  Date Reviewed: 1/4/2023            Codes Class Noted    CAD (coronary artery disease) ICD-10-CM: I25.10  ICD-9-CM: 414.00  9/13/2016    Overview Signed 11/8/2016  7:37 AM by Adam Moise MD     9/16 stress echo positive with findings consistent with left main or multivessel disease             Postoperative anemia due to acute blood loss ICD-10-CM: D62  ICD-9-CM: 285.1  9/16/2016        S/P CABG x 3 ICD-10-CM: Z95.1  ICD-9-CM: V45.81  9/15/2016    Overview Signed 9/15/2016 11:20 AM by KATHYA White     CORONARY ARTERY BYPASS GRAFT x3, LIMA to LAD. RSVG to Dx. RSVG to PDA  Right endoscopic greater saphenous vein harvest               SOB (shortness of breath) ICD-10-CM: R06.02  ICD-9-CM: 786.05  8/31/2016        Precordial pain ICD-10-CM: R07.2  ICD-9-CM: 786.51  8/31/2016    Overview Signed 8/31/2016  4:43 PM by Becky Terrell MD     11/02 [facility], stress echo, ?distal lateral wall infarct with improved wall motion following exercise             HTN (hypertension) ICD-10-CM: I10  ICD-9-CM: 401.9  8/31/2016        Mixed hyperlipidemia ICD-10-CM: Q11.8  ICD-9-CM: 272.2  8/31/2016           Current Outpatient Medications on File Prior to Visit   Medication Sig    chlorhexidine (PERIDEX) 0.12 % solution as needed. fluticasone propionate (FLONASE) 50 mcg/actuation nasal spray     rosuvastatin (CRESTOR) 20 mg tablet     sodium chloride (ROBERT-5) 5 % ophthalmic solution     nitroglycerin (NITROSTAT) 0.4 mg SL tablet 1 Tab by SubLINGual route every five (5) minutes as needed for Chest Pain (Max of 3 doses then call 911). psyllium (METAMUCIL) packet Take 1 Packet by mouth as needed. tamsulosin (FLOMAX) 0.4 mg capsule Take 0.4 mg by mouth daily. metoprolol succinate (TOPROL XL) 100 mg tablet Take 1 Tab by mouth daily. allopurinol (ZYLOPRIM) 300 mg tablet Take 300 mg by mouth daily. aspirin delayed-release 81 mg tablet Take 81 mg by mouth daily. cholecalciferol (VITAMIN D3) 25 mcg (1,000 unit) cap Take 1,000 Units by mouth daily. No current facility-administered medications on file prior to visit. CARDIOLOGY STUDIES TO DATE:  9/16 stress echo positive with findings consistent with left main or multivessel disease    Chief Complaint   Patient presents with    Follow-up     HPI :  He is doing great with no cardiac complaints or problems with his medications. Blood pressures well controlled and he is walking some and weight is held stable. Recent lipid profile looked pretty good except his triglycerides are slightly elevated. He is no longer on fenofibrate and his primary switched him from atorvastatin to rosuvastatin which is perfectly fine  CARDIAC ROS:   negative for chest pain, dyspnea, palpitations, syncope, orthopnea, paroxysmal nocturnal dyspnea, exertional chest pressure/discomfort, claudication, lower extremity edema    Family History   Problem Relation Age of Onset    Cancer Father         lung    Heart Disease Neg Hx        Past Medical History:   Diagnosis Date    CAD (coronary artery disease) 09/15/2016    CABG x3    HTN (hypertension)     Hyperlipidemia     Kidney stones     Polio        GENERAL ROS:  A comprehensive review of systems was negative except for that written in the HPI.     Visit Vitals  /72 (BP 1 Location: Left upper arm, BP Patient Position: Sitting, BP Cuff Size: Adult)   Pulse 65   Resp 18   Ht 5' 10\" (1.778 m)   Wt 202 lb (91.6 kg)   SpO2 96%   BMI 28.98 kg/m²       Wt Readings from Last 3 Encounters:   01/04/23 202 lb (91.6 kg)   06/29/22 203 lb (92.1 kg)   11/03/21 201 lb 6.4 oz (91.4 kg)            BP Readings from Last 3 Encounters:   01/04/23 130/72   06/29/22 114/60   11/03/21 120/70       PHYSICAL EXAM  General appearance: alert, cooperative, no distress, appears stated age  Neurologic: Alert and oriented X 3  Neck: supple, symmetrical, trachea midline, no adenopathy, no carotid bruit, and no JVD  Lungs: clear to auscultation bilaterally  Heart: regular rate and rhythm, S1, S2 normal, no murmur, click, rub or gallop  Extremities: extremities normal, atraumatic, no cyanosis or edema    Lab Results   Component Value Date/Time    Cholesterol, total 107 12/27/2022 07:54 AM    Cholesterol, total 114 06/22/2022 08:04 AM    Cholesterol, total 122 10/27/2021 08:31 AM    Cholesterol, total 121 03/25/2021 08:03 AM    Cholesterol, total 119 09/24/2020 08:16 AM    HDL Cholesterol 32 12/27/2022 07:54 AM    HDL Cholesterol 31 06/22/2022 08:04 AM    HDL Cholesterol 33 10/27/2021 08:31 AM    HDL Cholesterol 29 (L) 03/25/2021 08:03 AM    HDL Cholesterol 28 (L) 09/24/2020 08:16 AM    LDL, calculated 33.2 12/27/2022 07:54 AM    LDL, calculated 54.4 06/22/2022 08:04 AM    LDL, calculated 47.8 10/27/2021 08:31 AM    LDL, calculated 68 03/25/2021 08:03 AM    LDL, calculated 66 09/24/2020 08:16 AM    LDL, calculated 62 02/20/2020 07:39 AM    LDL, calculated 53 08/21/2019 08:10 AM    Triglyceride 209 (H) 12/27/2022 07:54 AM    Triglyceride 143 06/22/2022 08:04 AM    Triglyceride 206 (H) 10/27/2021 08:31 AM    Triglyceride 137 03/25/2021 08:03 AM    Triglyceride 143 09/24/2020 08:16 AM    CHOL/HDL Ratio 3.3 12/27/2022 07:54 AM    CHOL/HDL Ratio 3.7 06/22/2022 08:04 AM    CHOL/HDL Ratio 3.7 10/27/2021 08:31 AM     ASSESSMENT :      He is stable and asymptomatic, well compensated on a good medical regimen and needs no cardiac testing at this time. current treatment plan is effective, no change in therapy  lab results and schedule of future lab studies reviewed with patient  reviewed diet, exercise and weight control    Encounter Diagnoses   Name Primary?     Coronary artery disease involving native coronary artery of native heart without angina pectoris Yes    Primary hypertension     Mixed hyperlipidemia      Orders Placed This Encounter    chlorhexidine (PERIDEX) 0.12 % solution    fluticasone propionate (FLONASE) 50 mcg/actuation nasal spray    rosuvastatin (CRESTOR) 20 mg tablet    sodium chloride (ROBERT-5) 5 % ophthalmic solution       Follow-up and Dispositions    Return in about 6 months (around 7/4/2023). Liliana Talavera MD  1/4/2023  Please note that this dictation was completed with [employer], the computer voice recognition software. Quite often unanticipated grammatical, syntax, homophones, and other interpretive errors are inadvertently transcribed by the computer software. Please disregard these errors. Please excuse any errors that have escaped final proofreading. Thank you.

## 2023-03-24 ENCOUNTER — ANESTHESIA EVENT (OUTPATIENT)
Dept: SURGERY | Age: 78
End: 2023-03-24
Payer: MEDICARE

## 2023-03-24 ENCOUNTER — ANESTHESIA (OUTPATIENT)
Dept: SURGERY | Age: 78
End: 2023-03-24
Payer: MEDICARE

## 2023-03-24 ENCOUNTER — APPOINTMENT (OUTPATIENT)
Dept: CT IMAGING | Age: 78
DRG: 419 | End: 2023-03-24
Attending: EMERGENCY MEDICINE
Payer: MEDICARE

## 2023-03-24 ENCOUNTER — APPOINTMENT (OUTPATIENT)
Dept: GENERAL RADIOLOGY | Age: 78
DRG: 419 | End: 2023-03-24
Attending: SURGERY
Payer: MEDICARE

## 2023-03-24 ENCOUNTER — HOSPITAL ENCOUNTER (INPATIENT)
Age: 78
LOS: 1 days | Discharge: HOME | DRG: 419 | End: 2023-03-26
Attending: EMERGENCY MEDICINE | Admitting: SURGERY
Payer: MEDICARE

## 2023-03-24 DIAGNOSIS — K81.9 CHOLECYSTITIS: Primary | ICD-10-CM

## 2023-03-24 DIAGNOSIS — K80.00 CALCULUS OF GALLBLADDER WITH ACUTE CHOLECYSTITIS WITHOUT OBSTRUCTION: ICD-10-CM

## 2023-03-24 PROBLEM — K80.10 CHOLECYSTITIS WITH CHOLELITHIASIS: Status: ACTIVE | Noted: 2023-03-24

## 2023-03-24 LAB
ALBUMIN SERPL-MCNC: 3.7 G/DL (ref 3.5–5)
ALBUMIN/GLOB SERPL: 1.1 (ref 1.1–2.2)
ALP SERPL-CCNC: 82 U/L (ref 45–117)
ALT SERPL-CCNC: 24 U/L (ref 12–78)
ANION GAP SERPL CALC-SCNC: 5 MMOL/L (ref 5–15)
APPEARANCE UR: CLEAR
AST SERPL-CCNC: 28 U/L (ref 15–37)
BACTERIA URNS QL MICRO: NEGATIVE /HPF
BASOPHILS # BLD: 0 K/UL (ref 0–0.1)
BASOPHILS NFR BLD: 0 % (ref 0–1)
BILIRUB SERPL-MCNC: 1.3 MG/DL (ref 0.2–1)
BILIRUB UR QL: NEGATIVE
BUN SERPL-MCNC: 16 MG/DL (ref 6–20)
BUN/CREAT SERPL: 16 (ref 12–20)
CALCIUM SERPL-MCNC: 9.3 MG/DL (ref 8.5–10.1)
CHLORIDE SERPL-SCNC: 105 MMOL/L (ref 97–108)
CO2 SERPL-SCNC: 26 MMOL/L (ref 21–32)
COLOR UR: ABNORMAL
COMMENT, HOLDF: NORMAL
CREAT SERPL-MCNC: 1.03 MG/DL (ref 0.7–1.3)
DIFFERENTIAL METHOD BLD: ABNORMAL
EOSINOPHIL # BLD: 0 K/UL (ref 0–0.4)
EOSINOPHIL NFR BLD: 0 % (ref 0–7)
EPITH CASTS URNS QL MICRO: ABNORMAL /LPF
ERYTHROCYTE [DISTWIDTH] IN BLOOD BY AUTOMATED COUNT: 13.7 % (ref 11.5–14.5)
GLOBULIN SER CALC-MCNC: 3.5 G/DL (ref 2–4)
GLUCOSE SERPL-MCNC: 134 MG/DL (ref 65–100)
GLUCOSE UR STRIP.AUTO-MCNC: NEGATIVE MG/DL
HCT VFR BLD AUTO: 47.1 % (ref 36.6–50.3)
HGB BLD-MCNC: 15.9 G/DL (ref 12.1–17)
HGB UR QL STRIP: ABNORMAL
HYALINE CASTS URNS QL MICRO: ABNORMAL /LPF (ref 0–5)
IMM GRANULOCYTES # BLD AUTO: 0.1 K/UL (ref 0–0.04)
IMM GRANULOCYTES NFR BLD AUTO: 1 % (ref 0–0.5)
KETONES UR QL STRIP.AUTO: NEGATIVE MG/DL
LEUKOCYTE ESTERASE UR QL STRIP.AUTO: NEGATIVE
LIPASE SERPL-CCNC: 125 U/L (ref 73–393)
LYMPHOCYTES # BLD: 0.5 K/UL (ref 0.8–3.5)
LYMPHOCYTES NFR BLD: 5 % (ref 12–49)
MCH RBC QN AUTO: 30.3 PG (ref 26–34)
MCHC RBC AUTO-ENTMCNC: 33.8 G/DL (ref 30–36.5)
MCV RBC AUTO: 89.9 FL (ref 80–99)
MONOCYTES # BLD: 0.3 K/UL (ref 0–1)
MONOCYTES NFR BLD: 3 % (ref 5–13)
NEUTS SEG # BLD: 9.9 K/UL (ref 1.8–8)
NEUTS SEG NFR BLD: 91 % (ref 32–75)
NITRITE UR QL STRIP.AUTO: NEGATIVE
NRBC # BLD: 0 K/UL (ref 0–0.01)
NRBC BLD-RTO: 0 PER 100 WBC
PH UR STRIP: 5.5 (ref 5–8)
PLATELET # BLD AUTO: 210 K/UL (ref 150–400)
PMV BLD AUTO: 11 FL (ref 8.9–12.9)
POTASSIUM SERPL-SCNC: 4.3 MMOL/L (ref 3.5–5.1)
PROT SERPL-MCNC: 7.2 G/DL (ref 6.4–8.2)
PROT UR STRIP-MCNC: 30 MG/DL
RBC # BLD AUTO: 5.24 M/UL (ref 4.1–5.7)
RBC #/AREA URNS HPF: ABNORMAL /HPF (ref 0–5)
RBC MORPH BLD: ABNORMAL
SAMPLES BEING HELD,HOLD: NORMAL
SODIUM SERPL-SCNC: 136 MMOL/L (ref 136–145)
SP GR UR REFRACTOMETRY: 1.01
TROPONIN I SERPL HS-MCNC: 14 NG/L (ref 0–57)
UROBILINOGEN UR QL STRIP.AUTO: 0.2 EU/DL (ref 0.2–1)
WBC # BLD AUTO: 10.8 K/UL (ref 4.1–11.1)
WBC URNS QL MICRO: ABNORMAL /HPF (ref 0–4)

## 2023-03-24 PROCEDURE — 2709999900 HC NON-CHARGEABLE SUPPLY: Performed by: SURGERY

## 2023-03-24 PROCEDURE — 36415 COLL VENOUS BLD VENIPUNCTURE: CPT

## 2023-03-24 PROCEDURE — 77030008771 HC TU NG SALEM SUMP -A: Performed by: ANESTHESIOLOGY

## 2023-03-24 PROCEDURE — 93005 ELECTROCARDIOGRAM TRACING: CPT

## 2023-03-24 PROCEDURE — 74011250636 HC RX REV CODE- 250/636: Performed by: NURSE ANESTHETIST, CERTIFIED REGISTERED

## 2023-03-24 PROCEDURE — 74177 CT ABD & PELVIS W/CONTRAST: CPT

## 2023-03-24 PROCEDURE — 96375 TX/PRO/DX INJ NEW DRUG ADDON: CPT

## 2023-03-24 PROCEDURE — 77030002933 HC SUT MCRYL J&J -A: Performed by: SURGERY

## 2023-03-24 PROCEDURE — 77030038093 HC CATH CHOLGM OP PMI PRGV-C: Performed by: SURGERY

## 2023-03-24 PROCEDURE — 77030008608 HC TRCR ENDOSC SMTH AMR -B: Performed by: SURGERY

## 2023-03-24 PROCEDURE — 74011000636 HC RX REV CODE- 636: Performed by: SURGERY

## 2023-03-24 PROCEDURE — G0378 HOSPITAL OBSERVATION PER HR: HCPCS

## 2023-03-24 PROCEDURE — 83690 ASSAY OF LIPASE: CPT

## 2023-03-24 PROCEDURE — 80053 COMPREHEN METABOLIC PANEL: CPT

## 2023-03-24 PROCEDURE — 77030018875 HC APPL CLP LIG4 J&J -B: Performed by: SURGERY

## 2023-03-24 PROCEDURE — 81001 URINALYSIS AUTO W/SCOPE: CPT

## 2023-03-24 PROCEDURE — 77030031139 HC SUT VCRL2 J&J -A: Performed by: SURGERY

## 2023-03-24 PROCEDURE — 74011250636 HC RX REV CODE- 250/636: Performed by: SURGERY

## 2023-03-24 PROCEDURE — 77030009403 HC ELECTRD ENDO MEGA -B: Performed by: SURGERY

## 2023-03-24 PROCEDURE — 74011000250 HC RX REV CODE- 250: Performed by: NURSE ANESTHETIST, CERTIFIED REGISTERED

## 2023-03-24 PROCEDURE — 74011000636 HC RX REV CODE- 636: Performed by: RADIOLOGY

## 2023-03-24 PROCEDURE — 76210000006 HC OR PH I REC 0.5 TO 1 HR: Performed by: SURGERY

## 2023-03-24 PROCEDURE — 77030040361 HC SLV COMPR DVT MDII -B: Performed by: SURGERY

## 2023-03-24 PROCEDURE — 99221 1ST HOSP IP/OBS SF/LOW 40: CPT | Performed by: SURGERY

## 2023-03-24 PROCEDURE — 96365 THER/PROPH/DIAG IV INF INIT: CPT

## 2023-03-24 PROCEDURE — 77030007955 HC PCH ENDOSC SPEC J&J -B: Performed by: SURGERY

## 2023-03-24 PROCEDURE — 74011000258 HC RX REV CODE- 258: Performed by: SURGERY

## 2023-03-24 PROCEDURE — 74011250636 HC RX REV CODE- 250/636: Performed by: EMERGENCY MEDICINE

## 2023-03-24 PROCEDURE — 84484 ASSAY OF TROPONIN QUANT: CPT

## 2023-03-24 PROCEDURE — 74300 X-RAY BILE DUCTS/PANCREAS: CPT

## 2023-03-24 PROCEDURE — 77030003578 HC NDL INSUF VERES AMR -B: Performed by: SURGERY

## 2023-03-24 PROCEDURE — 74011000250 HC RX REV CODE- 250: Performed by: EMERGENCY MEDICINE

## 2023-03-24 PROCEDURE — 76010000153 HC OR TIME 1.5 TO 2 HR: Performed by: SURGERY

## 2023-03-24 PROCEDURE — 99285 EMERGENCY DEPT VISIT HI MDM: CPT

## 2023-03-24 PROCEDURE — 76060000034 HC ANESTHESIA 1.5 TO 2 HR: Performed by: SURGERY

## 2023-03-24 PROCEDURE — 77030039895 HC SYST SMK EVAC LAP COVD -B: Performed by: SURGERY

## 2023-03-24 PROCEDURE — 77030008756 HC TU IRR SUC STRY -B: Performed by: SURGERY

## 2023-03-24 PROCEDURE — 77030020829: Performed by: SURGERY

## 2023-03-24 PROCEDURE — 77030013079 HC BLNKT BAIR HGGR 3M -A: Performed by: ANESTHESIOLOGY

## 2023-03-24 PROCEDURE — 77030026438 HC STYL ET INTUB CARD -A: Performed by: ANESTHESIOLOGY

## 2023-03-24 PROCEDURE — 77030008606 HC TRCR ENDOSC KII AMR -B: Performed by: SURGERY

## 2023-03-24 PROCEDURE — 77030008684 HC TU ET CUF COVD -B: Performed by: ANESTHESIOLOGY

## 2023-03-24 PROCEDURE — 85025 COMPLETE CBC W/AUTO DIFF WBC: CPT

## 2023-03-24 PROCEDURE — 88304 TISSUE EXAM BY PATHOLOGIST: CPT

## 2023-03-24 RX ORDER — CEFAZOLIN SODIUM 1 G/3ML
INJECTION, POWDER, FOR SOLUTION INTRAMUSCULAR; INTRAVENOUS AS NEEDED
Status: DISCONTINUED | OUTPATIENT
Start: 2023-03-24 | End: 2023-03-25 | Stop reason: HOSPADM

## 2023-03-24 RX ORDER — NALOXONE HYDROCHLORIDE 0.4 MG/ML
0.4 INJECTION, SOLUTION INTRAMUSCULAR; INTRAVENOUS; SUBCUTANEOUS AS NEEDED
Status: DISCONTINUED | OUTPATIENT
Start: 2023-03-24 | End: 2023-03-26 | Stop reason: HOSPADM

## 2023-03-24 RX ORDER — FENTANYL CITRATE 50 UG/ML
INJECTION, SOLUTION INTRAMUSCULAR; INTRAVENOUS AS NEEDED
Status: DISCONTINUED | OUTPATIENT
Start: 2023-03-24 | End: 2023-03-25 | Stop reason: HOSPADM

## 2023-03-24 RX ORDER — METOPROLOL SUCCINATE 50 MG/1
100 TABLET, EXTENDED RELEASE ORAL DAILY
Status: DISCONTINUED | OUTPATIENT
Start: 2023-03-25 | End: 2023-03-26 | Stop reason: HOSPADM

## 2023-03-24 RX ORDER — ROSUVASTATIN CALCIUM 10 MG/1
20 TABLET, COATED ORAL
Status: DISCONTINUED | OUTPATIENT
Start: 2023-03-25 | End: 2023-03-26 | Stop reason: HOSPADM

## 2023-03-24 RX ORDER — DEXAMETHASONE SODIUM PHOSPHATE 4 MG/ML
INJECTION, SOLUTION INTRA-ARTICULAR; INTRALESIONAL; INTRAMUSCULAR; INTRAVENOUS; SOFT TISSUE AS NEEDED
Status: DISCONTINUED | OUTPATIENT
Start: 2023-03-24 | End: 2023-03-25 | Stop reason: HOSPADM

## 2023-03-24 RX ORDER — SODIUM CHLORIDE 0.9 % (FLUSH) 0.9 %
5-40 SYRINGE (ML) INJECTION EVERY 8 HOURS
Status: DISCONTINUED | OUTPATIENT
Start: 2023-03-24 | End: 2023-03-25 | Stop reason: HOSPADM

## 2023-03-24 RX ORDER — SODIUM CHLORIDE 0.9 % (FLUSH) 0.9 %
5-40 SYRINGE (ML) INJECTION AS NEEDED
Status: DISCONTINUED | OUTPATIENT
Start: 2023-03-24 | End: 2023-03-25 | Stop reason: HOSPADM

## 2023-03-24 RX ORDER — MIDAZOLAM HYDROCHLORIDE 1 MG/ML
INJECTION, SOLUTION INTRAMUSCULAR; INTRAVENOUS AS NEEDED
Status: DISCONTINUED | OUTPATIENT
Start: 2023-03-24 | End: 2023-03-25 | Stop reason: HOSPADM

## 2023-03-24 RX ORDER — SODIUM CHLORIDE 0.9 % (FLUSH) 0.9 %
5-40 SYRINGE (ML) INJECTION EVERY 8 HOURS
Status: DISCONTINUED | OUTPATIENT
Start: 2023-03-24 | End: 2023-03-26 | Stop reason: HOSPADM

## 2023-03-24 RX ORDER — SODIUM CHLORIDE 0.9 % (FLUSH) 0.9 %
5-40 SYRINGE (ML) INJECTION AS NEEDED
Status: DISCONTINUED | OUTPATIENT
Start: 2023-03-24 | End: 2023-03-26 | Stop reason: HOSPADM

## 2023-03-24 RX ORDER — HYDROMORPHONE HYDROCHLORIDE 1 MG/ML
.5-1 INJECTION, SOLUTION INTRAMUSCULAR; INTRAVENOUS; SUBCUTANEOUS
Status: DISCONTINUED | OUTPATIENT
Start: 2023-03-24 | End: 2023-03-26 | Stop reason: HOSPADM

## 2023-03-24 RX ORDER — ALLOPURINOL 300 MG/1
300 TABLET ORAL DAILY
Status: DISCONTINUED | OUTPATIENT
Start: 2023-03-25 | End: 2023-03-26 | Stop reason: HOSPADM

## 2023-03-24 RX ORDER — SODIUM CHLORIDE, SODIUM LACTATE, POTASSIUM CHLORIDE, CALCIUM CHLORIDE 600; 310; 30; 20 MG/100ML; MG/100ML; MG/100ML; MG/100ML
25 INJECTION, SOLUTION INTRAVENOUS CONTINUOUS
Status: DISCONTINUED | OUTPATIENT
Start: 2023-03-24 | End: 2023-03-25 | Stop reason: HOSPADM

## 2023-03-24 RX ORDER — TAMSULOSIN HYDROCHLORIDE 0.4 MG/1
0.4 CAPSULE ORAL DAILY
Status: DISCONTINUED | OUTPATIENT
Start: 2023-03-25 | End: 2023-03-26 | Stop reason: HOSPADM

## 2023-03-24 RX ORDER — ONDANSETRON 2 MG/ML
4 INJECTION INTRAMUSCULAR; INTRAVENOUS
Status: DISCONTINUED | OUTPATIENT
Start: 2023-03-24 | End: 2023-03-26 | Stop reason: HOSPADM

## 2023-03-24 RX ORDER — HYDROMORPHONE HYDROCHLORIDE 2 MG/ML
INJECTION, SOLUTION INTRAMUSCULAR; INTRAVENOUS; SUBCUTANEOUS AS NEEDED
Status: DISCONTINUED | OUTPATIENT
Start: 2023-03-24 | End: 2023-03-25 | Stop reason: HOSPADM

## 2023-03-24 RX ORDER — ACETAMINOPHEN 325 MG/1
650 TABLET ORAL
Status: DISCONTINUED | OUTPATIENT
Start: 2023-03-24 | End: 2023-03-26 | Stop reason: HOSPADM

## 2023-03-24 RX ORDER — METRONIDAZOLE 500 MG/100ML
500 INJECTION, SOLUTION INTRAVENOUS ONCE
Status: COMPLETED | OUTPATIENT
Start: 2023-03-24 | End: 2023-03-24

## 2023-03-24 RX ORDER — PROPOFOL 10 MG/ML
INJECTION, EMULSION INTRAVENOUS AS NEEDED
Status: DISCONTINUED | OUTPATIENT
Start: 2023-03-24 | End: 2023-03-25 | Stop reason: HOSPADM

## 2023-03-24 RX ORDER — SODIUM CHLORIDE, SODIUM LACTATE, POTASSIUM CHLORIDE, CALCIUM CHLORIDE 600; 310; 30; 20 MG/100ML; MG/100ML; MG/100ML; MG/100ML
100 INJECTION, SOLUTION INTRAVENOUS CONTINUOUS
Status: DISCONTINUED | OUTPATIENT
Start: 2023-03-24 | End: 2023-03-26 | Stop reason: HOSPADM

## 2023-03-24 RX ORDER — ROCURONIUM BROMIDE 10 MG/ML
INJECTION, SOLUTION INTRAVENOUS AS NEEDED
Status: DISCONTINUED | OUTPATIENT
Start: 2023-03-24 | End: 2023-03-25 | Stop reason: HOSPADM

## 2023-03-24 RX ORDER — DIPHENHYDRAMINE HYDROCHLORIDE 50 MG/ML
12.5 INJECTION, SOLUTION INTRAMUSCULAR; INTRAVENOUS
Status: DISCONTINUED | OUTPATIENT
Start: 2023-03-24 | End: 2023-03-26 | Stop reason: HOSPADM

## 2023-03-24 RX ORDER — DEXMEDETOMIDINE HYDROCHLORIDE 100 UG/ML
INJECTION, SOLUTION INTRAVENOUS AS NEEDED
Status: DISCONTINUED | OUTPATIENT
Start: 2023-03-24 | End: 2023-03-25 | Stop reason: HOSPADM

## 2023-03-24 RX ORDER — LIDOCAINE HYDROCHLORIDE 20 MG/ML
INJECTION, SOLUTION EPIDURAL; INFILTRATION; INTRACAUDAL; PERINEURAL AS NEEDED
Status: DISCONTINUED | OUTPATIENT
Start: 2023-03-24 | End: 2023-03-25 | Stop reason: HOSPADM

## 2023-03-24 RX ORDER — FLUTICASONE PROPIONATE 50 MCG
2 SPRAY, SUSPENSION (ML) NASAL DAILY PRN
Status: DISCONTINUED | OUTPATIENT
Start: 2023-03-24 | End: 2023-03-26 | Stop reason: HOSPADM

## 2023-03-24 RX ADMIN — MIDAZOLAM 2 MG: 1 INJECTION INTRAMUSCULAR; INTRAVENOUS at 23:06

## 2023-03-24 RX ADMIN — DEXAMETHASONE SODIUM PHOSPHATE 4 MG: 4 INJECTION, SOLUTION INTRAMUSCULAR; INTRAVENOUS at 23:21

## 2023-03-24 RX ADMIN — CEFAZOLIN 2 G: 330 INJECTION, POWDER, FOR SOLUTION INTRAMUSCULAR; INTRAVENOUS at 23:20

## 2023-03-24 RX ADMIN — LIDOCAINE HYDROCHLORIDE 100 MG: 20 INJECTION, SOLUTION EPIDURAL; INFILTRATION; INTRACAUDAL; PERINEURAL at 23:12

## 2023-03-24 RX ADMIN — HYDROMORPHONE HYDROCHLORIDE 0.5 MG: 2 INJECTION, SOLUTION INTRAMUSCULAR; INTRAVENOUS; SUBCUTANEOUS at 23:55

## 2023-03-24 RX ADMIN — PIPERACILLIN AND TAZOBACTAM 4.5 G: 4; .5 INJECTION, POWDER, FOR SOLUTION INTRAVENOUS at 21:40

## 2023-03-24 RX ADMIN — METRONIDAZOLE 500 MG: 500 INJECTION, SOLUTION INTRAVENOUS at 17:04

## 2023-03-24 RX ADMIN — SODIUM CHLORIDE 2 G: 9 INJECTION INTRAMUSCULAR; INTRAVENOUS; SUBCUTANEOUS at 16:23

## 2023-03-24 RX ADMIN — FENTANYL CITRATE 100 MCG: 50 INJECTION, SOLUTION INTRAMUSCULAR; INTRAVENOUS at 23:12

## 2023-03-24 RX ADMIN — SODIUM CHLORIDE, POTASSIUM CHLORIDE, SODIUM LACTATE AND CALCIUM CHLORIDE 100 ML/HR: 600; 310; 30; 20 INJECTION, SOLUTION INTRAVENOUS at 21:35

## 2023-03-24 RX ADMIN — DEXMEDETOMIDINE HYDROCHLORIDE 10 MCG: 100 INJECTION, SOLUTION, CONCENTRATE INTRAVENOUS at 23:53

## 2023-03-24 RX ADMIN — IOPAMIDOL 100 ML: 755 INJECTION, SOLUTION INTRAVENOUS at 15:18

## 2023-03-24 RX ADMIN — DEXMEDETOMIDINE HYDROCHLORIDE 10 MCG: 100 INJECTION, SOLUTION, CONCENTRATE INTRAVENOUS at 23:21

## 2023-03-24 RX ADMIN — PROPOFOL 150 MG: 10 INJECTION, EMULSION INTRAVENOUS at 23:12

## 2023-03-24 RX ADMIN — ROCURONIUM BROMIDE 50 MG: 10 SOLUTION INTRAVENOUS at 23:13

## 2023-03-24 NOTE — H&P
Surgery History and Physical    Subjective:      Simran Wynn is a 78 y.o. male who presents complaining of abdominal pain since 4:00 this morning. States that the pain is on the right side. Never had anything similar to this in the past.  States he is normally able to eat what ever he wants without any difficulties including fried and fatty foods. He initially thought he just had an upset stomach so he tried Pepto-Bismol which did not help. He did have some nausea vomiting and dry heaves. Denies any fevers chills sweats. States that since he got to the hospital he has been feeling a little bit better. Has a history of coronary artery disease which has been asymptomatic since his CABG 7 years ago      Patient Active Problem List    Diagnosis Date Noted    CAD (coronary artery disease) 09/13/2016    Postoperative anemia due to acute blood loss 09/16/2016    S/P CABG x 3 09/15/2016    SOB (shortness of breath) 08/31/2016    Precordial pain 08/31/2016    HTN (hypertension) 08/31/2016    Mixed hyperlipidemia 08/31/2016     Past Medical History:   Diagnosis Date    CAD (coronary artery disease) 09/15/2016    CABG x3    HTN (hypertension)     Hyperlipidemia     Kidney stones     Polio       Past Surgical History:   Procedure Laterality Date    HX HEART CATHETERIZATION Right 09/13/2016    needs CABG    HX TONSILLECTOMY      [place] CORONARY ARTERY BYP W/VEIN & ARTERY GRAFT 3 VEIN  09/15/2016    CABG x3      Social History     Tobacco Use    Smoking status: Never    Smokeless tobacco: Never   Substance Use Topics    Alcohol use: Yes     Alcohol/week: 2.0 standard drinks     Types: 1 Glasses of wine, 1 Shots of liquor per week     Comment: 1 a week      Family History   Problem Relation Age of Onset    Cancer Father         lung    Heart Disease Neg Hx       Prior to Admission medications    Medication Sig Start Date End Date Taking? Authorizing Provider   chlorhexidine (PERIDEX) 0.12 % solution as needed.  11/30/22 Provider, Historical   fluticasone propionate (FLONASE) 50 mcg/actuation nasal spray  12/10/22   Provider, Historical   rosuvastatin (CRESTOR) 20 mg tablet  12/21/22   Provider, Historical   sodium chloride (ROBERT-5) 5 % ophthalmic solution  12/21/22   Provider, Historical   nitroglycerin (NITROSTAT) 0.4 mg SL tablet 1 Tablet by SubLINGual route every five (5) minutes as needed for Chest Pain (Max of 3 doses then call 911). 1/4/23   Gary Ellison MD   psyllium (METAMUCIL) packet Take 1 Packet by mouth as needed. Provider, Historical   tamsulosin (FLOMAX) 0.4 mg capsule Take 0.4 mg by mouth daily. Provider, Historical   metoprolol succinate (TOPROL XL) 100 mg tablet Take 1 Tab by mouth daily. 11/8/16   Robert Bennett III, MD   allopurinol (ZYLOPRIM) 300 mg tablet Take 300 mg by mouth daily. Provider, Historical   aspirin delayed-release 81 mg tablet Take 81 mg by mouth daily. Provider, Historical   cholecalciferol (VITAMIN D3) 25 mcg (1,000 unit) cap Take 1,000 Units by mouth daily. Provider, Historical     Allergies   Allergen Reactions    Flu Vacc 2012 (18+)C. Deriv(Pf) Unknown (comments)     Patient states NOT allergic to Flu vaccine if they ave chicken         Review of Systems:    Pertinent items are noted in the History of Present Illness. Objective:     Visit Vitals  /68   Pulse 64   Temp 98.5 °F (36.9 °C)   Resp 20   SpO2 94%       Physical Exam:    GENERAL: alert, cooperative, no distress, appears stated age, EYE: negative, THROAT & NECK: normal, LUNG: clear to auscultation bilaterally, HEART: regular rate and rhythm, ABDOMEN: Soft with minimal tenderness in the right upper quadrant no rebound or guarding, EXTREMITIES:  no edema, SKIN: Normal., NEUROLOGIC: negative, PSYCH: non focal    Imaging:  images and reports reviewed  CT- Cholelithiasis. There are stones extending into the neck of the gallbladder near  the cystic duct.  There is gallbladder distention, gallbladder wall thickening,  and pericholecystic fluid consistent with acute cholecystitis. Lab Review:    Recent Results (from the past 24 hour(s))   CBC WITH AUTOMATED DIFF    Collection Time: 03/24/23  1:03 PM   Result Value Ref Range    WBC 10.8 4.1 - 11.1 K/uL    RBC 5.24 4.10 - 5.70 M/uL    HGB 15.9 12.1 - 17.0 g/dL    HCT 47.1 36.6 - 50.3 %    MCV 89.9 80.0 - 99.0 FL    MCH 30.3 26.0 - 34.0 PG    MCHC 33.8 30.0 - 36.5 g/dL    RDW 13.7 11.5 - 14.5 %    PLATELET 643 650 - 081 K/uL    MPV 11.0 8.9 - 12.9 FL    NRBC 0.0 0  WBC    ABSOLUTE NRBC 0.00 0.00 - 0.01 K/uL    NEUTROPHILS 91 (H) 32 - 75 %    LYMPHOCYTES 5 (L) 12 - 49 %    MONOCYTES 3 (L) 5 - 13 %    EOSINOPHILS 0 0 - 7 %    BASOPHILS 0 0 - 1 %    IMMATURE GRANULOCYTES 1 (H) 0.0 - 0.5 %    ABS. NEUTROPHILS 9.9 (H) 1.8 - 8.0 K/UL    ABS. LYMPHOCYTES 0.5 (L) 0.8 - 3.5 K/UL    ABS. MONOCYTES 0.3 0.0 - 1.0 K/UL    ABS. EOSINOPHILS 0.0 0.0 - 0.4 K/UL    ABS. BASOPHILS 0.0 0.0 - 0.1 K/UL    ABS. IMM. GRANS. 0.1 (H) 0.00 - 0.04 K/UL    DF SMEAR SCANNED      RBC COMMENTS NORMOCYTIC, NORMOCHROMIC     METABOLIC PANEL, COMPREHENSIVE    Collection Time: 03/24/23  1:03 PM   Result Value Ref Range    Sodium 136 136 - 145 mmol/L    Potassium 4.3 3.5 - 5.1 mmol/L    Chloride 105 97 - 108 mmol/L    CO2 26 21 - 32 mmol/L    Anion gap 5 5 - 15 mmol/L    Glucose 134 (H) 65 - 100 mg/dL    BUN 16 6 - 20 MG/DL    Creatinine 1.03 0.70 - 1.30 MG/DL    BUN/Creatinine ratio 16 12 - 20      eGFR >60 >60 ml/min/1.73m2    Calcium 9.3 8.5 - 10.1 MG/DL    Bilirubin, total 1.3 (H) 0.2 - 1.0 MG/DL    ALT (SGPT) 24 12 - 78 U/L    AST (SGOT) 28 15 - 37 U/L    Alk.  phosphatase 82 45 - 117 U/L    Protein, total 7.2 6.4 - 8.2 g/dL    Albumin 3.7 3.5 - 5.0 g/dL    Globulin 3.5 2.0 - 4.0 g/dL    A-G Ratio 1.1 1.1 - 2.2     SAMPLES BEING HELD    Collection Time: 03/24/23  1:03 PM   Result Value Ref Range    SAMPLES BEING HELD 1red 1blu     COMMENT        Add-on orders for these samples will be processed based on acceptable specimen integrity and analyte stability, which may vary by analyte. LIPASE    Collection Time: 03/24/23  1:03 PM   Result Value Ref Range    Lipase 125 73 - 393 U/L   TROPONIN-HIGH SENSITIVITY    Collection Time: 03/24/23  1:03 PM   Result Value Ref Range    Troponin-High Sensitivity 14 0 - 57 ng/L   URINALYSIS W/MICROSCOPIC    Collection Time: 03/24/23  4:34 PM   Result Value Ref Range    Color YELLOW/STRAW      Appearance CLEAR CLEAR      Specific gravity 1.010      pH (UA) 5.5 5.0 - 8.0      Protein 30 (A) NEG mg/dL    Glucose Negative NEG mg/dL    Ketone Negative NEG mg/dL    Bilirubin Negative NEG      Blood TRACE (A) NEG      Urobilinogen 0.2 0.2 - 1.0 EU/dL    Nitrites Negative NEG      Leukocyte Esterase Negative NEG      WBC 0-4 0 - 4 /hpf    RBC 0-5 0 - 5 /hpf    Epithelial cells FEW FEW /lpf    Bacteria Negative NEG /hpf    Hyaline cast 0-2 0 - 5 /lpf       Assessment:     Acute cholecystitis    Plan:     1. I recommend proceeding with Surgery:  Cholecystectomy and Laparoscopy with cholangiogram if possible. 2. Discussed aspects of surgical intervention, methods, risks (including by not limited to infection, bleeding, hematoma, and perforation of the intestines or solid organs), and the risks of general anesthetic. The patient understands the risks; any and all questions were answered to the patient's satisfaction.   N.p.o.  IV fluids  IV antibiotics  Placed him back on home medications after surgery

## 2023-03-24 NOTE — ED PROVIDER NOTES
HPI   79-year-old male with past medical history of coronary artery disease hypertension hyperlipidemia presents to the emergency department due to abdominal pain. He woke up in the morning and noticed that he was having pain just above the umbilicus. He had several episodes of nausea and vomiting. Pain was not radiating and was relatively constant until about an hour ago. Still present but has improved. Denies any fevers. No chest pain or shortness of breath. No diarrhea or blood in the stool. No urinary symptoms. He cannot recall having symptoms like this in the past.  He denies any prior abdominal surgeries  Past Medical History:   Diagnosis Date    CAD (coronary artery disease) 09/15/2016    CABG x3    HTN (hypertension)     Hyperlipidemia     Kidney stones     Polio        Past Surgical History:   Procedure Laterality Date    HX HEART CATHETERIZATION Right 09/13/2016    needs CABG    HX TONSILLECTOMY      IN CORONARY ARTERY BYP W/VEIN & ARTERY GRAFT 3 VEIN  09/15/2016    CABG x3         Family History:   Problem Relation Age of Onset    Cancer Father         lung    Heart Disease Neg Hx        Social History     Socioeconomic History    Marital status:      Spouse name: Not on file    Number of children: Not on file    Years of education: Not on file    Highest education level: Not on file   Occupational History    Not on file   Tobacco Use    Smoking status: Never    Smokeless tobacco: Never   Substance and Sexual Activity    Alcohol use:  Yes     Alcohol/week: 2.0 standard drinks     Types: 1 Glasses of wine, 1 Shots of liquor per week     Comment: 1 a week    Drug use: Never    Sexual activity: Not on file   Other Topics Concern    Not on file   Social History Narrative    Not on file     Social Determinants of Health     Financial Resource Strain: Not on file   Food Insecurity: Not on file   Transportation Needs: Not on file   Physical Activity: Not on file   Stress: Not on file   Social Connections: Not on file   Intimate Partner Violence: Not on file   Housing Stability: Not on file         ALLERGIES: Flu vacc 2012 (18+)c. deriv(pf)    Review of Systems  A complete review of systems was performed and all systems reviewed are negative unless otherwise documented in the HPI    Vitals:    03/24/23 1251   BP: (!) 150/82   Pulse: 76   Resp: 18   Temp: 98.4 °F (36.9 °C)   SpO2: 94%            Physical Exam  Constitutional:       Comments: Not acutely distressed. Nontoxic   HENT:      Mouth/Throat:      Comments: Moist mucous membranes  Eyes:      General: No scleral icterus. Extraocular Movements: Extraocular movements intact. Cardiovascular:      Rate and Rhythm: Normal rate and regular rhythm. Heart sounds: No murmur heard. Comments: Regular rate and rhythm without murmurs  Pulmonary:      Effort: Pulmonary effort is normal. No respiratory distress. Breath sounds: Normal breath sounds. No wheezing or rales. Abdominal:      Comments: Soft. Tenderness throughout the upper abdomen without rebound or guarding. Negative Smallwood sign   Skin:     General: Skin is warm and dry. Neurological:      Comments: Awake and alert. GCS 15        Medical Decision Making  Amount and/or Complexity of Data Reviewed  Labs: ordered. Radiology: ordered. ECG/medicine tests: ordered. Risk  Prescription drug management. Decision regarding hospitalization. 77-year-old man presenting with the above chief complaint. Vitals are stable. Mild tenderness throughout the upper abdomen but a nonsurgical abdomen on exam.  CT abdomen and pelvis will be ordered in addition to labs. CT shows findings concerning for cholecystitis. Labs are reassuring. Patient continues to decline any symptomatic treatment. General surgery consulted who will be admitting the patient. ED Course as of 03/24/23 2052   Fri Mar 24, 2023   1817 EKG shows normal sinus rhythm with a rate of 65. QTc 428.   No concerning ST elevation or depressions. No arrhythmias present.   No STEMI. [MM]      ED Course User Index  [MM] Domingo Menjivar MD       Procedures

## 2023-03-24 NOTE — ED TRIAGE NOTES
Pt arrives via EMS from home c/o abd pain that started at 0400 today. Pt reports +N/V. Denies urinary s/s. Glucose 145 in route.

## 2023-03-25 ENCOUNTER — APPOINTMENT (OUTPATIENT)
Dept: MRI IMAGING | Age: 78
DRG: 419 | End: 2023-03-25
Attending: RADIOLOGY
Payer: MEDICARE

## 2023-03-25 LAB
ALBUMIN SERPL-MCNC: 3.4 G/DL (ref 3.5–5)
ALBUMIN/GLOB SERPL: 1 (ref 1.1–2.2)
ALP SERPL-CCNC: 71 U/L (ref 45–117)
ALT SERPL-CCNC: 38 U/L (ref 12–78)
AST SERPL-CCNC: 35 U/L (ref 15–37)
BILIRUB DIRECT SERPL-MCNC: 0.3 MG/DL (ref 0–0.2)
BILIRUB SERPL-MCNC: 1.7 MG/DL (ref 0.2–1)
GLOBULIN SER CALC-MCNC: 3.5 G/DL (ref 2–4)
PROT SERPL-MCNC: 6.9 G/DL (ref 6.4–8.2)

## 2023-03-25 PROCEDURE — 80076 HEPATIC FUNCTION PANEL: CPT

## 2023-03-25 PROCEDURE — 74011000250 HC RX REV CODE- 250: Performed by: NURSE ANESTHETIST, CERTIFIED REGISTERED

## 2023-03-25 PROCEDURE — 74011250637 HC RX REV CODE- 250/637: Performed by: SURGERY

## 2023-03-25 PROCEDURE — 36415 COLL VENOUS BLD VENIPUNCTURE: CPT

## 2023-03-25 PROCEDURE — 74011000250 HC RX REV CODE- 250: Performed by: SURGERY

## 2023-03-25 PROCEDURE — BF131ZZ FLUOROSCOPY OF GALLBLADDER AND BILE DUCTS USING LOW OSMOLAR CONTRAST: ICD-10-PCS | Performed by: SURGERY

## 2023-03-25 PROCEDURE — 0FT44ZZ RESECTION OF GALLBLADDER, PERCUTANEOUS ENDOSCOPIC APPROACH: ICD-10-PCS | Performed by: SURGERY

## 2023-03-25 PROCEDURE — A9576 INJ PROHANCE MULTIPACK: HCPCS

## 2023-03-25 PROCEDURE — G0378 HOSPITAL OBSERVATION PER HR: HCPCS

## 2023-03-25 PROCEDURE — 47563 LAPARO CHOLECYSTECTOMY/GRAPH: CPT | Performed by: SURGERY

## 2023-03-25 PROCEDURE — 74011250636 HC RX REV CODE- 250/636: Performed by: SURGERY

## 2023-03-25 PROCEDURE — 77030021566 MRI ABD W MRCP W WO CONT

## 2023-03-25 PROCEDURE — 74011250636 HC RX REV CODE- 250/636

## 2023-03-25 PROCEDURE — 74011250636 HC RX REV CODE- 250/636: Performed by: NURSE ANESTHETIST, CERTIFIED REGISTERED

## 2023-03-25 PROCEDURE — 74011000258 HC RX REV CODE- 258: Performed by: SURGERY

## 2023-03-25 PROCEDURE — 99024 POSTOP FOLLOW-UP VISIT: CPT | Performed by: SURGERY

## 2023-03-25 RX ORDER — SODIUM CHLORIDE 0.9 % (FLUSH) 0.9 %
5-40 SYRINGE (ML) INJECTION EVERY 8 HOURS
Status: DISCONTINUED | OUTPATIENT
Start: 2023-03-25 | End: 2023-03-25 | Stop reason: HOSPADM

## 2023-03-25 RX ORDER — OXYCODONE AND ACETAMINOPHEN 5; 325 MG/1; MG/1
1-2 TABLET ORAL
Status: DISCONTINUED | OUTPATIENT
Start: 2023-03-25 | End: 2023-03-26 | Stop reason: HOSPADM

## 2023-03-25 RX ORDER — SODIUM CHLORIDE 9 MG/ML
INJECTION, SOLUTION INTRAVENOUS
Status: DISCONTINUED | OUTPATIENT
Start: 2023-03-25 | End: 2023-03-25 | Stop reason: HOSPADM

## 2023-03-25 RX ORDER — NORETHINDRONE AND ETHINYL ESTRADIOL 0.5-0.035
10 KIT ORAL ONCE
Status: DISCONTINUED | OUTPATIENT
Start: 2023-03-25 | End: 2023-03-25 | Stop reason: HOSPADM

## 2023-03-25 RX ORDER — MIDAZOLAM HYDROCHLORIDE 1 MG/ML
0.5 INJECTION, SOLUTION INTRAMUSCULAR; INTRAVENOUS
Status: DISCONTINUED | OUTPATIENT
Start: 2023-03-25 | End: 2023-03-25 | Stop reason: HOSPADM

## 2023-03-25 RX ORDER — SODIUM CHLORIDE 0.9 % (FLUSH) 0.9 %
5-40 SYRINGE (ML) INJECTION AS NEEDED
Status: DISCONTINUED | OUTPATIENT
Start: 2023-03-25 | End: 2023-03-25 | Stop reason: HOSPADM

## 2023-03-25 RX ORDER — NEOSTIGMINE METHYLSULFATE 1 MG/ML
INJECTION, SOLUTION INTRAVENOUS AS NEEDED
Status: DISCONTINUED | OUTPATIENT
Start: 2023-03-25 | End: 2023-03-25 | Stop reason: HOSPADM

## 2023-03-25 RX ORDER — FENTANYL CITRATE 50 UG/ML
25 INJECTION, SOLUTION INTRAMUSCULAR; INTRAVENOUS
Status: DISCONTINUED | OUTPATIENT
Start: 2023-03-25 | End: 2023-03-25 | Stop reason: HOSPADM

## 2023-03-25 RX ORDER — BUPIVACAINE HYDROCHLORIDE AND EPINEPHRINE 2.5; 5 MG/ML; UG/ML
INJECTION, SOLUTION EPIDURAL; INFILTRATION; INTRACAUDAL; PERINEURAL AS NEEDED
Status: DISCONTINUED | OUTPATIENT
Start: 2023-03-25 | End: 2023-03-25 | Stop reason: HOSPADM

## 2023-03-25 RX ORDER — GLYCOPYRROLATE 0.2 MG/ML
INJECTION INTRAMUSCULAR; INTRAVENOUS AS NEEDED
Status: DISCONTINUED | OUTPATIENT
Start: 2023-03-25 | End: 2023-03-25 | Stop reason: HOSPADM

## 2023-03-25 RX ORDER — OXYCODONE AND ACETAMINOPHEN 5; 325 MG/1; MG/1
1-2 TABLET ORAL
Qty: 20 TABLET | Refills: 0 | Status: SHIPPED | OUTPATIENT
Start: 2023-03-25 | End: 2023-03-29

## 2023-03-25 RX ORDER — SODIUM CHLORIDE 0.9 % (FLUSH) 0.9 %
10 SYRINGE (ML) INJECTION
Status: ACTIVE | OUTPATIENT
Start: 2023-03-25 | End: 2023-03-26

## 2023-03-25 RX ORDER — HYDROCODONE BITARTRATE AND ACETAMINOPHEN 5; 325 MG/1; MG/1
1 TABLET ORAL AS NEEDED
Status: DISCONTINUED | OUTPATIENT
Start: 2023-03-25 | End: 2023-03-25 | Stop reason: HOSPADM

## 2023-03-25 RX ORDER — HYDROMORPHONE HYDROCHLORIDE 1 MG/ML
0.2 INJECTION, SOLUTION INTRAMUSCULAR; INTRAVENOUS; SUBCUTANEOUS
Status: DISCONTINUED | OUTPATIENT
Start: 2023-03-25 | End: 2023-03-25 | Stop reason: HOSPADM

## 2023-03-25 RX ORDER — ONDANSETRON 2 MG/ML
INJECTION INTRAMUSCULAR; INTRAVENOUS AS NEEDED
Status: DISCONTINUED | OUTPATIENT
Start: 2023-03-25 | End: 2023-03-25 | Stop reason: HOSPADM

## 2023-03-25 RX ORDER — ONDANSETRON 2 MG/ML
4 INJECTION INTRAMUSCULAR; INTRAVENOUS AS NEEDED
Status: DISCONTINUED | OUTPATIENT
Start: 2023-03-25 | End: 2023-03-25 | Stop reason: HOSPADM

## 2023-03-25 RX ORDER — DIPHENHYDRAMINE HYDROCHLORIDE 50 MG/ML
12.5 INJECTION, SOLUTION INTRAMUSCULAR; INTRAVENOUS AS NEEDED
Status: DISCONTINUED | OUTPATIENT
Start: 2023-03-25 | End: 2023-03-25 | Stop reason: HOSPADM

## 2023-03-25 RX ADMIN — OXYCODONE HYDROCHLORIDE AND ACETAMINOPHEN 1 TABLET: 5; 325 TABLET ORAL at 13:59

## 2023-03-25 RX ADMIN — PIPERACILLIN AND TAZOBACTAM 3.38 G: 3; .375 INJECTION, POWDER, FOR SOLUTION INTRAVENOUS at 17:42

## 2023-03-25 RX ADMIN — SODIUM CHLORIDE, POTASSIUM CHLORIDE, SODIUM LACTATE AND CALCIUM CHLORIDE 100 ML/HR: 600; 310; 30; 20 INJECTION, SOLUTION INTRAVENOUS at 01:06

## 2023-03-25 RX ADMIN — PIPERACILLIN AND TAZOBACTAM 3.38 G: 3; .375 INJECTION, POWDER, FOR SOLUTION INTRAVENOUS at 01:56

## 2023-03-25 RX ADMIN — ONDANSETRON HYDROCHLORIDE 4 MG: 2 INJECTION, SOLUTION INTRAMUSCULAR; INTRAVENOUS at 00:32

## 2023-03-25 RX ADMIN — GADOTERIDOL 20 ML: 279.3 INJECTION, SOLUTION INTRAVENOUS at 15:03

## 2023-03-25 RX ADMIN — SODIUM CHLORIDE: 900 INJECTION, SOLUTION INTRAVENOUS at 00:32

## 2023-03-25 RX ADMIN — OXYCODONE HYDROCHLORIDE AND ACETAMINOPHEN 1 TABLET: 5; 325 TABLET ORAL at 05:24

## 2023-03-25 RX ADMIN — ROSUVASTATIN CALCIUM 20 MG: 10 TABLET, FILM COATED ORAL at 21:03

## 2023-03-25 RX ADMIN — TAMSULOSIN HYDROCHLORIDE 0.4 MG: 0.4 CAPSULE ORAL at 08:36

## 2023-03-25 RX ADMIN — PIPERACILLIN AND TAZOBACTAM 3.38 G: 3; .375 INJECTION, POWDER, FOR SOLUTION INTRAVENOUS at 08:37

## 2023-03-25 RX ADMIN — GLYCOPYRROLATE 0.4 MG: 0.2 INJECTION INTRAMUSCULAR; INTRAVENOUS at 00:32

## 2023-03-25 RX ADMIN — METOPROLOL SUCCINATE 100 MG: 50 TABLET, EXTENDED RELEASE ORAL at 08:36

## 2023-03-25 RX ADMIN — OXYCODONE HYDROCHLORIDE AND ACETAMINOPHEN 1 TABLET: 5; 325 TABLET ORAL at 21:03

## 2023-03-25 RX ADMIN — ALLOPURINOL 300 MG: 300 TABLET ORAL at 08:36

## 2023-03-25 RX ADMIN — SODIUM CHLORIDE, PRESERVATIVE FREE 10 ML: 5 INJECTION INTRAVENOUS at 21:03

## 2023-03-25 RX ADMIN — Medication 3 MG: at 00:32

## 2023-03-25 NOTE — ANESTHESIA PREPROCEDURE EVALUATION
Mariela Salcedo is a 78 y.o. male who presents complaining of abdominal pain since 4:00 this morning. States that the pain is on the right side.   Never had anything similar to this in the past.  States he is normally able to eat what ever he wants   Has a history of coronary artery disease which has been asymptomatic since his CABG 7 years ago    Anesthetic History               Review of Systems / Medical History  Patient summary reviewed, nursing notes reviewed and pertinent labs reviewed    Pulmonary                   Neuro/Psych         Neuromuscular disease    Comments: Hx polio Cardiovascular    Hypertension          CAD and CABG    Exercise tolerance: <4 METS     GI/Hepatic/Renal         Renal disease: stones       Endo/Other             Other Findings   Comments: Kidney stones  Polio  HTN (hypertension)  Hyperlipidemia  CAD (coronary artery disease)    3/24/23 13:03  HGB: 15.9  HCT: 47.1  MCV: 89.9  MCH: 30.3  MCHC: 33.8  RDW: 13.7  PLATELET: 810  9/36/41 13:03  Sodium: 136  Potassium: 4.3  Chloride: 105  CO2: 26  Anion gap: 5  Glucose: 134 (H)  BUN: 16  Creatinine: 1.03             Physical Exam    Airway  Mallampati: II  TM Distance: 4 - 6 cm         Cardiovascular  Regular rate and rhythm,  S1 and S2 normal,  no murmur, click, rub, or gallop             Dental  No notable dental hx       Pulmonary                 Abdominal         Other Findings            Anesthetic Plan    ASA: 3  Anesthesia type: general          Induction: Intravenous  Anesthetic plan and risks discussed with: Patient No

## 2023-03-25 NOTE — PROGRESS NOTES
Transition of Care Plan  RUR- obs  DISPOSITION: Home, CM to follow is any needs arise prior to dc for CM to assist  F/U with PCP/Specialist    Transport: per patient, will have a safe ride to home at [facility] was given, explained and signed by patient        CAROLE Morin, CRM  11:55 AM

## 2023-03-25 NOTE — ED NOTES
TRANSFER - OUT REPORT:    Verbal report given to PACU RN(name) on Yelena Valero  being transferred to [facility] for routine progression of care       Report consisted of patients Situation, Background, Assessment and   Recommendations(SBAR). Information from the following report(s) SBAR, ED Summary, [facility] and Recent Results was reviewed with the receiving nurse. Lines:   Peripheral IV 03/24/23 Left Antecubital (Active)   Site Assessment Clean, dry, & intact 03/24/23 1305   Phlebitis Assessment 0 03/24/23 1305   Infiltration Assessment 0 03/24/23 1305   Dressing Status Clean, dry, & intact 03/24/23 1305   Dressing Type Transparent 03/24/23 1305        Opportunity for questions and clarification was provided.       Patient transported with:   [facility]

## 2023-03-25 NOTE — ANESTHESIA POSTPROCEDURE EVALUATION
Procedure(s):  CHOLECYSTECTOMY LAPAROSCOPIC WITH INTRAOPERATIVE CHOLANGIOGRAMS. general    Anesthesia Post Evaluation      Multimodal analgesia: multimodal analgesia not used between 6 hours prior to anesthesia start to PACU discharge  Patient location during evaluation: PACU  Patient participation: complete - patient participated  Level of consciousness: awake  Pain score: 0  Pain management: adequate  Airway patency: patent  Anesthetic complications: no  Cardiovascular status: acceptable  Respiratory status: acceptable  Hydration status: acceptable  Post anesthesia nausea and vomiting:  none      INITIAL Post-op Vital signs:   Vitals Value Taken Time   /62 03/25/23 0120   Temp 36.6 °C (97.9 °F) 03/25/23 0048   Pulse 63 03/25/23 0126   Resp 15 03/25/23 0126   SpO2 97 % 03/25/23 0126   Vitals shown include unvalidated device data.

## 2023-03-25 NOTE — OP NOTES
Operative Note    Patient: Nishi Jackson MRN: [id number]  SSN: [id number]    YOB: 1945  Age: 78 y.o. Sex: male      Date of Surgery: 3/24/2023     Preoperative Diagnosis: CHOLECYSTITIS     Postoperative Diagnosis: CHOLECYSTITIS     Surgeon(s) and Role:     Arlyne Najjar, MD - Primary    Surgical Assistant: Tyler Ward     Anesthesia: General     Procedure: Procedure(s):  CHOLECYSTECTOMY LAPAROSCOPIC WITH INTRAOPERATIVE CHOLANGIOGRAMS    Indications: The patient was admitted to the hospital with acute cholecystitis . The patient now presents for laparoscopic cholecystectomy after discussing therapeutic alternatives. Discussed the risk of surgery including infection, hematoma, bleeding, bile duct or bowel injury, recurrence or persistence of symptoms  The patient understands the risk that the procedure could be an open procedure. The patient understands the risks, any and all questions were answered to the patient's satisfaction, and they freely signed the consent for operation. Procedure in Detail: Patient was brought to the operating placed in the operative table in supine position. General endotracheal anesthesia was then established. He was then prepped and draped in usual sterile fashion. 5 mm supraumbilical incision was then made. Veress needle was placed within the abdomen and saline drop test was performed. Once were assured we within the abdomen the abdomen was insufflated to 15 mmHg. Veress needle was removed and a 5 mm Optiview trocar was then placed. 2 additional 5 mm and 1-12 mm trocar were then placed the gallbladder was identified and grabbed. There were some adhesions up to the gallbladder itself. These were teased down. The gallbladder was then elevated. The area of the triangle Calot was identified. This was then cleaned off. There was a crossing vessel that was bleeding this was clipped and cut.   The common bile duct was identified and the cystic duct was identified. Staying on the cystic duct side we were able to circumferentially dissected out. There was some tearing on the gallbladder itself with some spillage. This was controlled with suction and clips. Once we were able to completely get around the cystic duct. A Calles catheter was placed on the distal cystic duct and the angiocatheter was then placed. We then flushed the Angiocath and it appeared to be in good position. Several runs of cholangiogram were then done using 50-50 Isovue. Initially it looks like there may be some stones but on further flushing what was seen in the common bile duct looked more like bubbles. Once we were able to aspirate the bubbles out and do a clean around nothing that looks like stones remained. We then removed the Angiocath and the Calles clamp. We then placed clips both proximally and distally on the cystic duct this was then clipped and cut. Small branches of the cystic artery were identified circumferentially dissected out clipped and cut. The main cystic artery was identified circumferentially cleaned off clipped and cut. The gallbladder was then elevated off the hepatic fossa using cautery. It was eventually amputated placed into an Endobag and removed via the 12 m trocar site. The abdomen was irrigated. Bleeders on the liver were identified and cauterized. Once no further bleeding was seen, the 12 mm trocar was removed. The 12 mm trocar site was then closed using 0 Vicryl suture and the Endo fascial closure device. The abdomen was desufflated the other trocars removed local anesthetic was injected and the skin was closed using 4-0 Monocryl subcuticular fashion. Mastisol Steri-Strips 2 x 2 seconds were applied.   Patient was woken the OR and taken to PACU in stable condition    Estimated Blood Loss:  30    Findings  Acute cholecystitis cholelithiasis  No obvious CBD stones    Tourniquet Time: * No tourniquets in log *      Implants: * No implants in log * Specimens:   ID Type Source Tests Collected by Time Destination   1 : gallbladder Fresh Gallbladder  Shellia MD Sidney 3/24/2023 0008 Pathology           Drains: None                Complications: None    Counts: Sponge and needle counts were correct times two.

## 2023-03-25 NOTE — PROGRESS NOTES
Progress Note    Patient: Myrene Cowden MRN: [id number]  SSN: [id number]    YOB: 1945  Age: 78 y.o. Sex: male      Admit Date: 3/24/2023    1 Day Post-Op    Procedure:  Procedure(s):  CHOLECYSTECTOMY LAPAROSCOPIC WITH INTRAOPERATIVE CHOLANGIOGRAMS    Subjective:     Patient complains of some incisional pain. Tolerating diet. Objective:     Visit Vitals  BP (!) 165/75 (BP 1 Location: Right upper arm)   Pulse 63   Temp 98 °F (36.7 °C)   Resp 17   SpO2 95%       Temp (24hrs), Av °F (36.7 °C), Min:97.6 °F (36.4 °C), Max:98.5 °F (36.9 °C)      Physical Exam:    General alert no acute distress  Lungs clear bilaterally  Heart regular rate and rhythm  Abdomen soft incisional tenderness no rebound or guarding    Data Review: images and reports reviewed  MRCP- Cholecystectomy with small amount of fluid/edema in the gallbladder fossa. No biliary dilatation. 2. Stable right adrenal nodule which contains macroscopic fat, favoring . .. Lab Review: All lab results for the last 24 hours reviewed. Recent Results (from the past 12 hour(s))   HEPATIC FUNCTION PANEL    Collection Time: 23  8:00 AM   Result Value Ref Range    Protein, total 6.9 6.4 - 8.2 g/dL    Albumin 3.4 (L) 3.5 - 5.0 g/dL    Globulin 3.5 2.0 - 4.0 g/dL    A-G Ratio 1.0 (L) 1.1 - 2.2      Bilirubin, total 1.7 (H) 0.2 - 1.0 MG/DL    Bilirubin, direct 0.3 (H) 0.0 - 0.2 MG/DL    Alk. phosphatase 71 45 - 117 U/L    AST (SGOT) 35 15 - 37 U/L    ALT (SGPT) 38 12 - 78 U/L       Assessment:     Hospital Problems  Date Reviewed: 2023            Codes Class Noted POA    Cholecystitis with cholelithiasis ICD-10-CM: K80.10  ICD-9-CM: 574.10  3/24/2023 Unknown           Plan/Recommendations/Medical Decision Making:   Patient was noted to have elevated bilirubins on his lab this morning therefore MRCP was ordered. MRCP did not show any common bile duct stones.   Additionally there did not appear to be convincing common bile duct stones seen on the intraoperative cholangiogram.  We will continue to monitor the bilirubin. If it continues to rise will get GI to see patient for possible ERCP as both of these previous modalities may have missed stone. Continue regular diet for now.

## 2023-03-25 NOTE — PERIOP NOTES
TRANSFER - OUT REPORT:    Verbal report given to YASH Preciado(name) on Rafael Saleh  being transferred to Room 533(unit) for routine post - op       Report consisted of patients Situation, Background, Assessment and   Recommendations(SBAR). Time Pre op antibiotic given:2320  Anesthesia Stop time: 0967    Information from the following report(s) ED Summary, OR Summary, MAR, and Cardiac Rhythm Sinus Opal Row  was reviewed with the receiving nurse. Opportunity for questions and clarification was provided. Is the patient on 02? NO       L/Min        Other     Is the patient on a monitor? NO    Is the nurse transporting with the patient? YES    Surgical Waiting Area notified of patient's transfer from PACU? NO      The following personal items collected during your admission accompanied patient upon transfer:   Dental Appliance: Dental Appliances: None  Vision: Visual Aid: Glasses, With patient  Hearing Aid:    Jewelry:    Clothing: Clothing: At bedside  Other Valuables:  Other Valuables: Cell Phone, Eyeglasses, At bedside  Valuables sent to safe:

## 2023-03-26 VITALS
RESPIRATION RATE: 16 BRPM | DIASTOLIC BLOOD PRESSURE: 81 MMHG | TEMPERATURE: 97.5 F | HEART RATE: 52 BPM | OXYGEN SATURATION: 93 % | SYSTOLIC BLOOD PRESSURE: 169 MMHG

## 2023-03-26 LAB
ALBUMIN SERPL-MCNC: 3.1 G/DL (ref 3.5–5)
ALBUMIN/GLOB SERPL: 1 (ref 1.1–2.2)
ALP SERPL-CCNC: 61 U/L (ref 45–117)
ALT SERPL-CCNC: 38 U/L (ref 12–78)
ANION GAP SERPL CALC-SCNC: 4 MMOL/L (ref 5–15)
AST SERPL-CCNC: 41 U/L (ref 15–37)
BASOPHILS # BLD: 0.1 K/UL (ref 0–0.1)
BASOPHILS NFR BLD: 1 % (ref 0–1)
BILIRUB DIRECT SERPL-MCNC: 0.2 MG/DL (ref 0–0.2)
BILIRUB SERPL-MCNC: 1.6 MG/DL (ref 0.2–1)
BUN SERPL-MCNC: 23 MG/DL (ref 6–20)
BUN/CREAT SERPL: 22 (ref 12–20)
CALCIUM SERPL-MCNC: 8 MG/DL (ref 8.5–10.1)
CHLORIDE SERPL-SCNC: 107 MMOL/L (ref 97–108)
CO2 SERPL-SCNC: 25 MMOL/L (ref 21–32)
CREAT SERPL-MCNC: 1.04 MG/DL (ref 0.7–1.3)
DIFFERENTIAL METHOD BLD: ABNORMAL
EOSINOPHIL # BLD: 0.1 K/UL (ref 0–0.4)
EOSINOPHIL NFR BLD: 1 % (ref 0–7)
ERYTHROCYTE [DISTWIDTH] IN BLOOD BY AUTOMATED COUNT: 13.9 % (ref 11.5–14.5)
GLOBULIN SER CALC-MCNC: 3 G/DL (ref 2–4)
GLUCOSE SERPL-MCNC: 114 MG/DL (ref 65–100)
HCT VFR BLD AUTO: 41.6 % (ref 36.6–50.3)
HGB BLD-MCNC: 13.8 G/DL (ref 12.1–17)
IMM GRANULOCYTES # BLD AUTO: 0 K/UL (ref 0–0.04)
IMM GRANULOCYTES NFR BLD AUTO: 0 % (ref 0–0.5)
LYMPHOCYTES # BLD: 1.2 K/UL (ref 0.8–3.5)
LYMPHOCYTES NFR BLD: 11 % (ref 12–49)
MCH RBC QN AUTO: 30.4 PG (ref 26–34)
MCHC RBC AUTO-ENTMCNC: 33.2 G/DL (ref 30–36.5)
MCV RBC AUTO: 91.6 FL (ref 80–99)
MONOCYTES # BLD: 1.3 K/UL (ref 0–1)
MONOCYTES NFR BLD: 11 % (ref 5–13)
NEUTS SEG # BLD: 8.7 K/UL (ref 1.8–8)
NEUTS SEG NFR BLD: 76 % (ref 32–75)
NRBC # BLD: 0 K/UL (ref 0–0.01)
NRBC BLD-RTO: 0 PER 100 WBC
PLATELET # BLD AUTO: 178 K/UL (ref 150–400)
PMV BLD AUTO: 10.7 FL (ref 8.9–12.9)
POTASSIUM SERPL-SCNC: 4 MMOL/L (ref 3.5–5.1)
PROT SERPL-MCNC: 6.1 G/DL (ref 6.4–8.2)
RBC # BLD AUTO: 4.54 M/UL (ref 4.1–5.7)
SODIUM SERPL-SCNC: 136 MMOL/L (ref 136–145)
WBC # BLD AUTO: 11.4 K/UL (ref 4.1–11.1)

## 2023-03-26 PROCEDURE — 74011000258 HC RX REV CODE- 258: Performed by: SURGERY

## 2023-03-26 PROCEDURE — 85025 COMPLETE CBC W/AUTO DIFF WBC: CPT

## 2023-03-26 PROCEDURE — 74011250636 HC RX REV CODE- 250/636: Performed by: SURGERY

## 2023-03-26 PROCEDURE — 74011000250 HC RX REV CODE- 250: Performed by: SURGERY

## 2023-03-26 PROCEDURE — 36415 COLL VENOUS BLD VENIPUNCTURE: CPT

## 2023-03-26 PROCEDURE — 80048 BASIC METABOLIC PNL TOTAL CA: CPT

## 2023-03-26 PROCEDURE — 96376 TX/PRO/DX INJ SAME DRUG ADON: CPT

## 2023-03-26 PROCEDURE — 80076 HEPATIC FUNCTION PANEL: CPT

## 2023-03-26 PROCEDURE — 65270000029 HC RM PRIVATE

## 2023-03-26 PROCEDURE — G0378 HOSPITAL OBSERVATION PER HR: HCPCS

## 2023-03-26 PROCEDURE — 74011250637 HC RX REV CODE- 250/637: Performed by: SURGERY

## 2023-03-26 PROCEDURE — 99024 POSTOP FOLLOW-UP VISIT: CPT | Performed by: SURGERY

## 2023-03-26 RX ADMIN — TAMSULOSIN HYDROCHLORIDE 0.4 MG: 0.4 CAPSULE ORAL at 09:11

## 2023-03-26 RX ADMIN — PIPERACILLIN AND TAZOBACTAM 3.38 G: 3; .375 INJECTION, POWDER, FOR SOLUTION INTRAVENOUS at 09:11

## 2023-03-26 RX ADMIN — OXYCODONE HYDROCHLORIDE AND ACETAMINOPHEN 2 TABLET: 5; 325 TABLET ORAL at 14:57

## 2023-03-26 RX ADMIN — METOPROLOL SUCCINATE 100 MG: 50 TABLET, EXTENDED RELEASE ORAL at 09:12

## 2023-03-26 RX ADMIN — SODIUM CHLORIDE, PRESERVATIVE FREE 10 ML: 5 INJECTION INTRAVENOUS at 05:49

## 2023-03-26 RX ADMIN — ALLOPURINOL 300 MG: 300 TABLET ORAL at 09:11

## 2023-03-26 RX ADMIN — PIPERACILLIN AND TAZOBACTAM 3.38 G: 3; .375 INJECTION, POWDER, FOR SOLUTION INTRAVENOUS at 01:20

## 2023-03-26 NOTE — PROGRESS NOTES
Progress Note    Patient: Fidencio Byrnes MRN: [id number]  SSN: [id number]    YOB: 1945  Age: 78 y.o. Sex: male      Admit Date: 3/24/2023    2 Days Post-Op    Procedure:  Procedure(s):  CHOLECYSTECTOMY LAPAROSCOPIC WITH INTRAOPERATIVE CHOLANGIOGRAMS    Subjective:     Patient feeling a little tired today. Feels like he overdid it a little bit. Tolerating diet. No increased pain. Objective:     Visit Vitals  /74   Pulse (!) 56   Temp 98 °F (36.7 °C)   Resp 16   SpO2 96%       Temp (24hrs), Av.2 °F (36.8 °C), Min:98 °F (36.7 °C), Max:98.7 °F (37.1 °C)      Physical Exam:    General alert no acute distress  Eyes nonicteric  Abdomen soft nontender nondistended dressings intact    Data Review: images and reports reviewed    Lab Review: All lab results for the last 24 hours reviewed. Recent Results (from the past 24 hour(s))   CBC WITH AUTOMATED DIFF    Collection Time: 23 12:24 AM   Result Value Ref Range    WBC 11.4 (H) 4.1 - 11.1 K/uL    RBC 4.54 4.10 - 5.70 M/uL    HGB 13.8 12.1 - 17.0 g/dL    HCT 41.6 36.6 - 50.3 %    MCV 91.6 80.0 - 99.0 FL    MCH 30.4 26.0 - 34.0 PG    MCHC 33.2 30.0 - 36.5 g/dL    RDW 13.9 11.5 - 14.5 %    PLATELET 428 730 - 554 K/uL    MPV 10.7 8.9 - 12.9 FL    NRBC 0.0 0  WBC    ABSOLUTE NRBC 0.00 0.00 - 0.01 K/uL    NEUTROPHILS 76 (H) 32 - 75 %    LYMPHOCYTES 11 (L) 12 - 49 %    MONOCYTES 11 5 - 13 %    EOSINOPHILS 1 0 - 7 %    BASOPHILS 1 0 - 1 %    IMMATURE GRANULOCYTES 0 0.0 - 0.5 %    ABS. NEUTROPHILS 8.7 (H) 1.8 - 8.0 K/UL    ABS. LYMPHOCYTES 1.2 0.8 - 3.5 K/UL    ABS. MONOCYTES 1.3 (H) 0.0 - 1.0 K/UL    ABS. EOSINOPHILS 0.1 0.0 - 0.4 K/UL    ABS. BASOPHILS 0.1 0.0 - 0.1 K/UL    ABS. IMM.  GRANS. 0.0 0.00 - 0.04 K/UL    DF AUTOMATED     HEPATIC FUNCTION PANEL    Collection Time: 23 12:24 AM   Result Value Ref Range    Protein, total 6.1 (L) 6.4 - 8.2 g/dL    Albumin 3.1 (L) 3.5 - 5.0 g/dL    Globulin 3.0 2.0 - 4.0 g/dL    A-G Ratio 1.0 (L) 1.1 - 2.2      Bilirubin, total 1.6 (H) 0.2 - 1.0 MG/DL    Bilirubin, direct 0.2 0.0 - 0.2 MG/DL    Alk. phosphatase 61 45 - 117 U/L    AST (SGOT) 41 (H) 15 - 37 U/L    ALT (SGPT) 38 12 - 78 U/L   METABOLIC PANEL, BASIC    Collection Time: 03/26/23 12:24 AM   Result Value Ref Range    Sodium 136 136 - 145 mmol/L    Potassium 4.0 3.5 - 5.1 mmol/L    Chloride 107 97 - 108 mmol/L    CO2 25 21 - 32 mmol/L    Anion gap 4 (L) 5 - 15 mmol/L    Glucose 114 (H) 65 - 100 mg/dL    BUN 23 (H) 6 - 20 MG/DL    Creatinine 1.04 0.70 - 1.30 MG/DL    BUN/Creatinine ratio 22 (H) 12 - 20      eGFR >60 >60 ml/min/1.73m2    Calcium 8.0 (L) 8.5 - 10.1 MG/DL       Assessment:     Hospital Problems  Date Reviewed: 1/4/2023            Codes Class Noted POA    Cholecystitis with cholelithiasis ICD-10-CM: K80.10  ICD-9-CM: 574.10  3/24/2023 Unknown           Plan/Recommendations/Medical Decision Making:   Status post lap orestes  Overall he is doing well. Bilirubins are coming down. At this point as we have 2 modalities that do not show any common bile duct stones and his bilirubin is coming down it is likely safe for him to be discharged home. Have instructed him that warning signs for obstructive process and that he is to return if these develop.   Continue low-fat diet  We will discharge

## 2023-03-26 NOTE — DISCHARGE SUMMARY
Physician Discharge Summary     Patient ID:  Jose Parker  [id number]  78 y.o.  1945    Allergies: Flu vacc 2012 (18+)c. deriv(pf)    Admit Date: 3/24/2023    Discharge Date: 3/26/2023    * Admission Diagnoses: Cholecystitis with cholelithiasis [K80.10]    * Discharge Diagnoses:    Hospital Problems as of 3/26/2023 Date Reviewed: 1/4/2023            Codes Class Noted - Resolved POA    Cholecystitis with cholelithiasis ICD-10-CM: K80.10  ICD-9-CM: 574.10  3/24/2023 - Present Unknown            Admission Condition: Fair    * Discharge Condition: improved    * Procedures: Procedure(s):  CHOLECYSTECTOMY LAPAROSCOPIC WITH INTRAOPERATIVE CHOLANGIOGRAMS    [facility] Course:   Patient was admitted with acute cholecystitis and elevated bilirubin. Underwent a laparoscopic cholecystectomy with intraoperative cholangiogram which was negative. On postoperative day #1 his bilirubin was a little bit more elevated and therefore he underwent an MRCP that was negative for common bile duct stone. By postoperative day #2 his bilirubin was slightly coming down he was feeling well and he was therefore discharged home    Consults: None    Significant Diagnostic Studies: radiology: CT acute cholecystitis  MRCP no CBD stones    * Disposition: Home    Discharge Medications:   Current Discharge Medication List        START taking these medications    Details   oxyCODONE-acetaminophen (PERCOCET) 5-325 mg per tablet Take 1-2 Tablets by mouth every six (6) hours as needed for Pain for up to 4 days. Max Daily Amount: 8 Tablets. Qty: 20 Tablet, Refills: 0  Start date: 3/25/2023, End date: 3/29/2023    Associated Diagnoses: Calculus of gallbladder with acute cholecystitis without obstruction             * Follow-up Care/Patient Instructions:   Activity: See surgical instructions  Diet: Low fat, Low cholesterol  Wound Care: As directed    Follow-up Information       Follow up With Specialties Details Why Vivian Kaminski MD General Surgery Follow up in 2 week(s) For wound re-check [facility]  [address] Simi Orlando MD Internal Medicine Physician   [address]  [phone]            Follow-up tests/labs     Signed:  Cheyenne Morgan MD  3/26/2023  1:00 PM

## 2023-03-26 NOTE — PROGRESS NOTES
Transition of Care Plan  RUR inpatient today    Medicare pt has received, reviewed, and signed 1st IM letter informing them of their right to appeal the discharge. Signed copy has been placed on pt bedside chart. Laparoscopic Cholecystectomy 3/24/22    Disposition-- Home    Transportation  friend or family    [address]   will arrange if ordered    Medical follow up  PCP and specialist    Contact   Cousin  Luanne Qureshi  [phone]    CM met with patient in his room   [place] was self care and independent prior to admission--driving and independent with adl' and iad's. He plans to return home. Has family in [facility] and they have been calling patient. He said they are coming to visit and may transport him home. He said he will have a ride when discharged with family or a friend in [facility]    No identified transition of care needs at this time but will follow and assist with any that arise.

## 2023-03-27 LAB
ATRIAL RATE: 65 BPM
CALCULATED P AXIS, ECG09: 47 DEGREES
CALCULATED R AXIS, ECG10: -48 DEGREES
CALCULATED T AXIS, ECG11: 58 DEGREES
DIAGNOSIS, 93000: NORMAL
P-R INTERVAL, ECG05: 168 MS
Q-T INTERVAL, ECG07: 412 MS
QRS DURATION, ECG06: 88 MS
QTC CALCULATION (BEZET), ECG08: 428 MS
VENTRICULAR RATE, ECG03: 65 BPM

## 2023-04-22 DIAGNOSIS — E78.2 MIXED HYPERLIPIDEMIA: Primary | ICD-10-CM

## 2023-04-23 DIAGNOSIS — E78.2 MIXED HYPERLIPIDEMIA: Primary | ICD-10-CM

## 2023-05-21 RX ORDER — FLUTICASONE PROPIONATE 50 MCG
SPRAY, SUSPENSION (ML) NASAL
COMMUNITY
Start: 2022-12-10

## 2023-05-21 RX ORDER — ASPIRIN 81 MG/1
81 TABLET ORAL DAILY
COMMUNITY

## 2023-05-21 RX ORDER — METOPROLOL SUCCINATE 100 MG/1
100 TABLET, EXTENDED RELEASE ORAL DAILY
COMMUNITY
Start: 2016-11-08

## 2023-05-21 RX ORDER — TAMSULOSIN HYDROCHLORIDE 0.4 MG/1
0.4 CAPSULE ORAL DAILY
COMMUNITY

## 2023-05-21 RX ORDER — NITROGLYCERIN 0.4 MG/1
0.4 TABLET SUBLINGUAL
COMMUNITY
Start: 2023-01-04

## 2023-05-21 RX ORDER — SILICONE ADHESIVE 1.5" X 3"
SHEET (EA) TOPICAL
COMMUNITY
Start: 2022-12-21

## 2023-05-21 RX ORDER — ROSUVASTATIN CALCIUM 20 MG/1
TABLET, COATED ORAL
COMMUNITY
Start: 2022-12-21

## 2023-05-21 RX ORDER — ALLOPURINOL 300 MG/1
300 TABLET ORAL DAILY
COMMUNITY

## 2023-05-21 RX ORDER — CHLORHEXIDINE GLUCONATE 0.12 MG/ML
RINSE ORAL PRN
COMMUNITY
Start: 2022-11-30 | End: 2023-07-12

## 2023-07-12 ENCOUNTER — OFFICE VISIT (OUTPATIENT)
Age: 78
End: 2023-07-12
Payer: MEDICARE

## 2023-07-12 VITALS
SYSTOLIC BLOOD PRESSURE: 130 MMHG | BODY MASS INDEX: 28.63 KG/M2 | HEIGHT: 70 IN | OXYGEN SATURATION: 96 % | WEIGHT: 200 LBS | HEART RATE: 64 BPM | DIASTOLIC BLOOD PRESSURE: 70 MMHG | RESPIRATION RATE: 16 BRPM

## 2023-07-12 DIAGNOSIS — I25.10 CORONARY ARTERY DISEASE INVOLVING NATIVE CORONARY ARTERY OF NATIVE HEART WITHOUT ANGINA PECTORIS: Primary | ICD-10-CM

## 2023-07-12 DIAGNOSIS — I10 PRIMARY HYPERTENSION: ICD-10-CM

## 2023-07-12 DIAGNOSIS — Z95.1 S/P CABG X 3: ICD-10-CM

## 2023-07-12 DIAGNOSIS — E78.2 MIXED HYPERLIPIDEMIA: ICD-10-CM

## 2023-07-12 PROCEDURE — 99213 OFFICE O/P EST LOW 20 MIN: CPT | Performed by: SPECIALIST

## 2023-07-12 NOTE — PROGRESS NOTES
HISTORY OF PRESENT ILLNESS  Kenna Smith is a 78 y.o. male     SUMMARY:   Patient Active Problem List   Diagnosis    CAD (coronary artery disease)    Mixed hyperlipidemia    HTN (hypertension)    SOB (shortness of breath)    Precordial pain    S/P CABG x 3    Postoperative anemia due to acute blood loss    Cholecystitis with cholelithiasis       Current Outpatient Medications   Medication Sig Dispense Refill    allopurinol (ZYLOPRIM) 300 MG tablet Take 1 tablet by mouth daily      aspirin 81 MG EC tablet Take 1 tablet by mouth daily      vitamin D 25 MCG (1000 UT) CAPS Take 1 capsule by mouth daily      fluticasone (FLONASE) 50 MCG/ACT nasal spray ceived the following from [facility]: Outside name: fluticasone propionate (FLONASE) 50 mcg/actuation nasal spray      metoprolol succinate (TOPROL XL) 100 MG extended release tablet Take 1 tablet by mouth daily      rosuvastatin (CRESTOR) 20 MG tablet ceived the following from [facility]: Outside name: rosuvastatin (CRESTOR) 20 mg tablet      sodium chloride (DELGADO 128) 5 % ophthalmic solution ceived the following from [facility]: Outside name: sodium chloride (DELGADO-5) 5 % ophthalmic solution      tamsulosin (FLOMAX) 0.4 MG capsule Take 1 capsule by mouth daily      nitroGLYCERIN (NITROSTAT) 0.4 MG SL tablet Place 1 tablet under the tongue       No current facility-administered medications for this visit. CARDIOLOGY STUDIES TO DATE:  9/16 stress echo positive with findings consistent with left main or multivessel disease    Chief Complaint   Patient presents with    6 Month Follow-Up       HPI :  He is doing well with no cardiac complaints or problems with his medications. Weight is stable and blood pressure is well controlled. [place] He brought in some blood work from his primary care from May.   Total cholesterol 113 triglycerides

## 2023-11-21 ENCOUNTER — TRANSCRIBE ORDERS (OUTPATIENT)
Facility: HOSPITAL | Age: 78
End: 2023-11-21

## 2023-11-21 DIAGNOSIS — M70.71 ISCHIAL BURSITIS, RIGHT: Primary | ICD-10-CM

## 2023-12-01 ENCOUNTER — HOSPITAL ENCOUNTER (OUTPATIENT)
Facility: HOSPITAL | Age: 78
End: 2023-12-01
Attending: STUDENT IN AN ORGANIZED HEALTH CARE EDUCATION/TRAINING PROGRAM
Payer: MEDICARE

## 2023-12-01 DIAGNOSIS — M70.71 ISCHIAL BURSITIS, RIGHT: ICD-10-CM

## 2023-12-01 PROCEDURE — 73721 MRI JNT OF LWR EXTRE W/O DYE: CPT

## 2024-01-18 DIAGNOSIS — E78.2 MIXED HYPERLIPIDEMIA: ICD-10-CM

## 2024-01-18 LAB
ALBUMIN SERPL-MCNC: 3.9 G/DL (ref 3.5–5)
ALBUMIN/GLOB SERPL: 1.5 (ref 1.1–2.2)
ALT SERPL-CCNC: 27 U/L (ref 12–78)
ANION GAP SERPL CALC-SCNC: 7 MMOL/L (ref 5–15)
AST SERPL-CCNC: 18 U/L (ref 15–37)
BILIRUB SERPL-MCNC: 1.7 MG/DL (ref 0.2–1)
BUN SERPL-MCNC: 20 MG/DL (ref 6–20)
BUN/CREAT SERPL: 20 (ref 12–20)
CALCIUM SERPL-MCNC: 8.7 MG/DL (ref 8.5–10.1)
CHLORIDE SERPL-SCNC: 105 MMOL/L (ref 97–108)
CHOLEST SERPL-MCNC: 117 MG/DL
CO2 SERPL-SCNC: 28 MMOL/L (ref 21–32)
CREAT SERPL-MCNC: 1.01 MG/DL (ref 0.7–1.3)
GLOBULIN SER CALC-MCNC: 2.6 G/DL (ref 2–4)
GLUCOSE SERPL-MCNC: 93 MG/DL (ref 65–100)
HDLC SERPL-MCNC: 33 MG/DL
HDLC SERPL: 3.5 (ref 0–5)
LDLC SERPL CALC-MCNC: 44.2 MG/DL (ref 0–100)
POTASSIUM SERPL-SCNC: 4.3 MMOL/L (ref 3.5–5.1)
PROT SERPL-MCNC: 6.5 G/DL (ref 6.4–8.2)
SODIUM SERPL-SCNC: 140 MMOL/L (ref 136–145)
TRIGL SERPL-MCNC: 199 MG/DL
VLDLC SERPL CALC-MCNC: 39.8 MG/DL

## 2024-01-19 DIAGNOSIS — E78.2 MIXED HYPERLIPIDEMIA: Primary | ICD-10-CM

## 2024-01-26 ENCOUNTER — OFFICE VISIT (OUTPATIENT)
Age: 79
End: 2024-01-26
Payer: MEDICARE

## 2024-01-26 VITALS
WEIGHT: 200 LBS | BODY MASS INDEX: 28.63 KG/M2 | DIASTOLIC BLOOD PRESSURE: 72 MMHG | OXYGEN SATURATION: 97 % | HEIGHT: 70 IN | HEART RATE: 59 BPM | SYSTOLIC BLOOD PRESSURE: 138 MMHG

## 2024-01-26 DIAGNOSIS — I25.10 CORONARY ARTERY DISEASE INVOLVING NATIVE CORONARY ARTERY OF NATIVE HEART WITHOUT ANGINA PECTORIS: Primary | ICD-10-CM

## 2024-01-26 DIAGNOSIS — E78.2 MIXED HYPERLIPIDEMIA: ICD-10-CM

## 2024-01-26 DIAGNOSIS — Z95.1 S/P CABG X 3: ICD-10-CM

## 2024-01-26 DIAGNOSIS — I10 PRIMARY HYPERTENSION: ICD-10-CM

## 2024-01-26 PROCEDURE — 1036F TOBACCO NON-USER: CPT | Performed by: SPECIALIST

## 2024-01-26 PROCEDURE — 3075F SYST BP GE 130 - 139MM HG: CPT | Performed by: SPECIALIST

## 2024-01-26 PROCEDURE — G8419 CALC BMI OUT NRM PARAM NOF/U: HCPCS | Performed by: SPECIALIST

## 2024-01-26 PROCEDURE — 99214 OFFICE O/P EST MOD 30 MIN: CPT | Performed by: SPECIALIST

## 2024-01-26 PROCEDURE — 1123F ACP DISCUSS/DSCN MKR DOCD: CPT | Performed by: SPECIALIST

## 2024-01-26 PROCEDURE — G8483 FLU IMM NO ADMIN DOC REA: HCPCS | Performed by: SPECIALIST

## 2024-01-26 PROCEDURE — 3078F DIAST BP <80 MM HG: CPT | Performed by: SPECIALIST

## 2024-01-26 PROCEDURE — G8427 DOCREV CUR MEDS BY ELIG CLIN: HCPCS | Performed by: SPECIALIST

## 2024-01-26 RX ORDER — GABAPENTIN 300 MG/1
300 CAPSULE ORAL 3 TIMES DAILY
COMMUNITY
Start: 2023-11-21

## 2024-01-26 RX ORDER — NITROGLYCERIN 0.4 MG/1
0.4 TABLET SUBLINGUAL EVERY 5 MIN PRN
Qty: 90 TABLET | Refills: 1 | Status: SHIPPED | OUTPATIENT
Start: 2024-01-26

## 2024-01-26 NOTE — PROGRESS NOTES
HISTORY OF PRESENT ILLNESS  Duy Blas is a 78 y.o. male     SUMMARY:   Patient Active Problem List   Diagnosis    CAD (coronary artery disease)    Mixed hyperlipidemia    HTN (hypertension)    SOB (shortness of breath)    Precordial pain    S/P CABG x 3    Postoperative anemia due to acute blood loss    Cholecystitis with cholelithiasis            CARDIOLOGY STUDIES TO DATE:  9/16 stress echo positive with findings consistent with left main or multivessel disease    Chief Complaint   Patient presents with    Coronary Artery Disease       HPI :  He is doing well with no cardiac complaints or problems with his medications.  He has been having some lower back issues which is limited his exercise but fortunately his weight is stable and his blood pressure is well-controlled.  Recent lipid profile looked outstanding except for slightly elevated triglycerides, though those are improved from prior.    CARDIAC ROS:   negative for chest pain, claudication, dyspnea, irregular heart beat, lower extremity edema, orthopnea, paroxysmal nocturnal dyspnea, and syncope    Family History   Problem Relation Age of Onset    Heart Disease Neg Hx     Cancer Father         lung       Past Medical History:   Diagnosis Date    CAD (coronary artery disease) 09/15/2016    CABG x3    HTN (hypertension)     Hyperlipidemia     Kidney stones     Polio        Specialty Problems          Cardiology Problems    CAD (coronary artery disease)        HTN (hypertension)        Mixed hyperlipidemia        Precordial pain            GENERAL ROS:  A comprehensive review of systems was negative except for what was noted in the HPI.  /72 (Site: Left Upper Arm, Position: Sitting, Cuff Size: Medium Adult)   Pulse 59   Ht 1.778 m (5' 10\")   Wt 90.7 kg (200 lb)   SpO2 97%   BMI 28.70 kg/m²     Wt Readings from Last 3 Encounters:   01/26/24 90.7 kg (200 lb)   07/12/23 90.7 kg (200 lb)   04/11/23 90.9 kg (200 lb 6.4 oz)            BP

## 2024-08-06 DIAGNOSIS — E78.2 MIXED HYPERLIPIDEMIA: ICD-10-CM

## 2024-08-06 LAB
ALBUMIN SERPL-MCNC: 3.7 G/DL (ref 3.5–5)
ALBUMIN/GLOB SERPL: 1.5 (ref 1.1–2.2)
ALP SERPL-CCNC: 83 U/L (ref 45–117)
ALT SERPL-CCNC: 21 U/L (ref 12–78)
ANION GAP SERPL CALC-SCNC: 5 MMOL/L (ref 5–15)
AST SERPL-CCNC: 19 U/L (ref 15–37)
BILIRUB SERPL-MCNC: 1.6 MG/DL (ref 0.2–1)
BUN SERPL-MCNC: 14 MG/DL (ref 6–20)
BUN/CREAT SERPL: 15 (ref 12–20)
CALCIUM SERPL-MCNC: 9.3 MG/DL (ref 8.5–10.1)
CHLORIDE SERPL-SCNC: 109 MMOL/L (ref 97–108)
CHOLEST SERPL-MCNC: 115 MG/DL
CO2 SERPL-SCNC: 26 MMOL/L (ref 21–32)
CREAT SERPL-MCNC: 0.96 MG/DL (ref 0.7–1.3)
GLOBULIN SER CALC-MCNC: 2.4 G/DL (ref 2–4)
GLUCOSE SERPL-MCNC: 107 MG/DL (ref 65–100)
HDLC SERPL-MCNC: 30 MG/DL
HDLC SERPL: 3.8 (ref 0–5)
LDLC SERPL CALC-MCNC: 35.4 MG/DL (ref 0–100)
POTASSIUM SERPL-SCNC: 4 MMOL/L (ref 3.5–5.1)
PROT SERPL-MCNC: 6.1 G/DL (ref 6.4–8.2)
SODIUM SERPL-SCNC: 140 MMOL/L (ref 136–145)
TRIGL SERPL-MCNC: 248 MG/DL
VLDLC SERPL CALC-MCNC: 49.6 MG/DL

## 2024-08-12 ENCOUNTER — OFFICE VISIT (OUTPATIENT)
Age: 79
End: 2024-08-12
Payer: MEDICARE

## 2024-08-12 VITALS
HEART RATE: 65 BPM | BODY MASS INDEX: 29.75 KG/M2 | HEIGHT: 70 IN | DIASTOLIC BLOOD PRESSURE: 60 MMHG | WEIGHT: 207.8 LBS | SYSTOLIC BLOOD PRESSURE: 116 MMHG | OXYGEN SATURATION: 97 %

## 2024-08-12 DIAGNOSIS — I10 ESSENTIAL (PRIMARY) HYPERTENSION: ICD-10-CM

## 2024-08-12 DIAGNOSIS — Z95.1 S/P CABG X 3: ICD-10-CM

## 2024-08-12 DIAGNOSIS — E78.2 MIXED HYPERLIPIDEMIA: ICD-10-CM

## 2024-08-12 DIAGNOSIS — I25.10 CORONARY ARTERY DISEASE INVOLVING NATIVE CORONARY ARTERY OF NATIVE HEART WITHOUT ANGINA PECTORIS: Primary | ICD-10-CM

## 2024-08-12 PROCEDURE — G8419 CALC BMI OUT NRM PARAM NOF/U: HCPCS | Performed by: SPECIALIST

## 2024-08-12 PROCEDURE — 3074F SYST BP LT 130 MM HG: CPT | Performed by: SPECIALIST

## 2024-08-12 PROCEDURE — 1123F ACP DISCUSS/DSCN MKR DOCD: CPT | Performed by: SPECIALIST

## 2024-08-12 PROCEDURE — 99214 OFFICE O/P EST MOD 30 MIN: CPT | Performed by: SPECIALIST

## 2024-08-12 PROCEDURE — 3078F DIAST BP <80 MM HG: CPT | Performed by: SPECIALIST

## 2024-08-12 PROCEDURE — G8427 DOCREV CUR MEDS BY ELIG CLIN: HCPCS | Performed by: SPECIALIST

## 2024-08-12 PROCEDURE — 1036F TOBACCO NON-USER: CPT | Performed by: SPECIALIST

## 2024-08-12 ASSESSMENT — PATIENT HEALTH QUESTIONNAIRE - PHQ9
SUM OF ALL RESPONSES TO PHQ QUESTIONS 1-9: 0
2. FEELING DOWN, DEPRESSED OR HOPELESS: NOT AT ALL
SUM OF ALL RESPONSES TO PHQ QUESTIONS 1-9: 0
1. LITTLE INTEREST OR PLEASURE IN DOING THINGS: NOT AT ALL
SUM OF ALL RESPONSES TO PHQ QUESTIONS 1-9: 0
SUM OF ALL RESPONSES TO PHQ9 QUESTIONS 1 & 2: 0
SUM OF ALL RESPONSES TO PHQ QUESTIONS 1-9: 0

## 2024-08-12 NOTE — PROGRESS NOTES
HISTORY OF PRESENT ILLNESS  Duy Blas is a 78 y.o. male     SUMMARY:   Patient Active Problem List   Diagnosis    CAD (coronary artery disease)    Mixed hyperlipidemia    HTN (hypertension)    SOB (shortness of breath)    Precordial pain    S/P CABG x 3    Postoperative anemia due to acute blood loss    Cholecystitis with cholelithiasis            CARDIOLOGY STUDIES TO DATE:  9/16 stress echo positive with findings consistent with left main or multivessel disease    Chief Complaint   Patient presents with    6 Month Follow-Up     Coronary artery disease involving native coronary artery of native heart without angina pectoris    Hypertension    Hyperlipidemia       HPI :  He is doing great with no cardiac complaints or problems with his medications.  He is somewhat active and walks some but not as much as he should.  He is gained a little weight and recent lipid profile looked good except for low HDL and elevated triglycerides.  Blood pressure is well-controlled.    CARDIAC ROS:   negative for chest pain, claudication, dyspnea, irregular heart beat, lower extremity edema, orthopnea, paroxysmal nocturnal dyspnea, and syncope    Family History   Problem Relation Age of Onset    Heart Disease Neg Hx     Cancer Father         lung       Past Medical History:   Diagnosis Date    CAD (coronary artery disease) 09/15/2016    CABG x3    HTN (hypertension)     Hyperlipidemia     Kidney stones     Polio        Specialty Problems          Cardiology Problems    CAD (coronary artery disease)        HTN (hypertension)        Mixed hyperlipidemia        Precordial pain            GENERAL ROS:  A comprehensive review of systems was negative except for what was noted in the HPI.  /60 (Site: Left Upper Arm, Position: Sitting, Cuff Size: Medium Adult)   Pulse 65   Ht 1.778 m (5' 10\")   Wt 94.3 kg (207 lb 12.8 oz)   SpO2 97%   BMI 29.82 kg/m²     Wt Readings from Last 3 Encounters:   08/12/24 94.3 kg (207 lb 12.8

## 2025-02-07 DIAGNOSIS — E78.2 MIXED HYPERLIPIDEMIA: ICD-10-CM

## 2025-02-07 LAB
ALBUMIN SERPL-MCNC: 3.5 G/DL (ref 3.5–5)
ALBUMIN/GLOB SERPL: 1.3 (ref 1.1–2.2)
ALP SERPL-CCNC: 73 U/L (ref 45–117)
ALT SERPL-CCNC: 28 U/L (ref 12–78)
ANION GAP SERPL CALC-SCNC: 6 MMOL/L (ref 2–12)
AST SERPL-CCNC: 21 U/L (ref 15–37)
BILIRUB SERPL-MCNC: 1.6 MG/DL (ref 0.2–1)
BUN SERPL-MCNC: 14 MG/DL (ref 6–20)
BUN/CREAT SERPL: 14 (ref 12–20)
CALCIUM SERPL-MCNC: 9 MG/DL (ref 8.5–10.1)
CHLORIDE SERPL-SCNC: 105 MMOL/L (ref 97–108)
CHOLEST SERPL-MCNC: 114 MG/DL
CO2 SERPL-SCNC: 28 MMOL/L (ref 21–32)
CREAT SERPL-MCNC: 0.99 MG/DL (ref 0.7–1.3)
GLOBULIN SER CALC-MCNC: 2.7 G/DL (ref 2–4)
GLUCOSE SERPL-MCNC: 104 MG/DL (ref 65–100)
HDLC SERPL-MCNC: 32 MG/DL
HDLC SERPL: 3.6 (ref 0–5)
LDLC SERPL CALC-MCNC: 45.8 MG/DL (ref 0–100)
POTASSIUM SERPL-SCNC: 4.3 MMOL/L (ref 3.5–5.1)
PROT SERPL-MCNC: 6.2 G/DL (ref 6.4–8.2)
SODIUM SERPL-SCNC: 139 MMOL/L (ref 136–145)
TRIGL SERPL-MCNC: 181 MG/DL
VLDLC SERPL CALC-MCNC: 36.2 MG/DL

## 2025-02-11 DIAGNOSIS — E78.2 MIXED HYPERLIPIDEMIA: Primary | ICD-10-CM

## 2025-02-11 DIAGNOSIS — I10 ESSENTIAL (PRIMARY) HYPERTENSION: ICD-10-CM

## 2025-02-11 NOTE — RESULT ENCOUNTER NOTE
Attempted to reach patient by telephone. A message was left for return call. Lab orders placed for CMP/Lipid panel for 6 months

## 2025-02-13 NOTE — RESULT ENCOUNTER NOTE
Mailed pt copy of recent lab results with reminder to have lab work repeated in 6 months-[facility] Lab slip [facility].Mailed to address on file

## 2025-03-07 ENCOUNTER — OFFICE VISIT (OUTPATIENT)
Age: 80
End: 2025-03-07
Payer: MEDICARE

## 2025-03-07 VITALS
OXYGEN SATURATION: 97 % | DIASTOLIC BLOOD PRESSURE: 62 MMHG | SYSTOLIC BLOOD PRESSURE: 124 MMHG | BODY MASS INDEX: 29.12 KG/M2 | WEIGHT: 203.4 LBS | HEART RATE: 56 BPM | HEIGHT: 70 IN

## 2025-03-07 DIAGNOSIS — I10 ESSENTIAL (PRIMARY) HYPERTENSION: ICD-10-CM

## 2025-03-07 DIAGNOSIS — E78.2 MIXED HYPERLIPIDEMIA: ICD-10-CM

## 2025-03-07 DIAGNOSIS — Z95.1 S/P CABG X 3: ICD-10-CM

## 2025-03-07 DIAGNOSIS — I25.10 CORONARY ARTERY DISEASE INVOLVING NATIVE CORONARY ARTERY OF NATIVE HEART WITHOUT ANGINA PECTORIS: Primary | ICD-10-CM

## 2025-03-07 PROCEDURE — 99214 OFFICE O/P EST MOD 30 MIN: CPT | Performed by: SPECIALIST

## 2025-03-07 PROCEDURE — 1036F TOBACCO NON-USER: CPT | Performed by: SPECIALIST

## 2025-03-07 PROCEDURE — 1123F ACP DISCUSS/DSCN MKR DOCD: CPT | Performed by: SPECIALIST

## 2025-03-07 PROCEDURE — G8427 DOCREV CUR MEDS BY ELIG CLIN: HCPCS | Performed by: SPECIALIST

## 2025-03-07 PROCEDURE — G8419 CALC BMI OUT NRM PARAM NOF/U: HCPCS | Performed by: SPECIALIST

## 2025-03-07 PROCEDURE — 3078F DIAST BP <80 MM HG: CPT | Performed by: SPECIALIST

## 2025-03-07 PROCEDURE — 1159F MED LIST DOCD IN RCRD: CPT | Performed by: SPECIALIST

## 2025-03-07 PROCEDURE — 1160F RVW MEDS BY RX/DR IN RCRD: CPT | Performed by: SPECIALIST

## 2025-03-07 PROCEDURE — 3074F SYST BP LT 130 MM HG: CPT | Performed by: SPECIALIST

## 2025-03-07 PROCEDURE — 1126F AMNT PAIN NOTED NONE PRSNT: CPT | Performed by: SPECIALIST

## 2025-03-07 RX ORDER — OXYCODONE HYDROCHLORIDE 5 MG/1
5 TABLET ORAL EVERY 4 HOURS PRN
COMMUNITY

## 2025-03-07 ASSESSMENT — PATIENT HEALTH QUESTIONNAIRE - PHQ9
SUM OF ALL RESPONSES TO PHQ QUESTIONS 1-9: 0
1. LITTLE INTEREST OR PLEASURE IN DOING THINGS: NOT AT ALL
SUM OF ALL RESPONSES TO PHQ QUESTIONS 1-9: 0
2. FEELING DOWN, DEPRESSED OR HOPELESS: NOT AT ALL

## 2025-03-07 NOTE — PROGRESS NOTES
1. Have you been to the ER, urgent care clinic since your last visit?  Hospitalized since your last visit?No    2. Have you seen or consulted any other health care providers outside of the [facility] since your last visit?  Include any pap smears or colon screening. No    
from [facility]: Outside name: fluticasone propionate (FLONASE) 50 mcg/actuation nasal spray      metoprolol succinate (TOPROL XL) 100 MG extended release tablet Take 1 tablet by mouth daily      rosuvastatin (CRESTOR) 20 MG tablet ceived the following from [facility]: Outside name: rosuvastatin (CRESTOR) 20 mg tablet      sodium chloride (DELGADO 128) 5 % ophthalmic solution ceived the following from [facility]: Outside name: sodium chloride (DELGADO-5) 5 % ophthalmic solution      tamsulosin (FLOMAX) 0.4 MG capsule Take 1 capsule by mouth daily       No current facility-administered medications for this visit.         ALLERGIES  Patient has no known allergies.       ASSESSMENT & PLAN:      He is stable and asymptomatic, well compensated on a good medical regimen and needs no cardiac testing at this time.  We will continue his omega-3 fatty acids, low-dose aspirin, metoprolol and Crestor.  We gave him a lab slip for follow-up blood work.    Current treatment plan is effective,reviewed diet, exercise and weight control     1. Coronary artery disease involving native coronary artery of native heart without angina pectoris  2. Mixed hyperlipidemia  3. S/P CABG x 3  4. Essential (primary) hypertension       No future appointments.     Toby Eli MD  3/7/2025  Please note that this dictation was completed with [employer], the computer voice recognition software.  Quite often unanticipated grammatical, syntax, homophones, and other interpretive errors are inadvertently transcribed by the computer software.  Please disregard these errors.  Please excuse any errors that have escaped final proofreading.  Thank you.

## (undated) DEVICE — PMI OPERATIVE CHOLANGIOGRAM CATHETER; TUBING IS 76CM IN LENGTH, 16GA WITH A: Brand: PMI

## (undated) DEVICE — E-Z CLEAN, PTFE COATED, ELECTROSURGICAL LAPAROSCOPIC ELECTRODE, L-HOOK, 33 CM., SINGLE-USE, FOR USE WITH HAND CONTROL PENCIL: Brand: MEGADYNE

## (undated) DEVICE — GLOVE SURG ALOE LF PF 6.5 -- SENSICARE

## (undated) DEVICE — GAUZE,SPONGE,2"X2",8PLY,STERILE,LF,2'S: Brand: MEDLINE

## (undated) DEVICE — TROCAR: Brand: KII FIOS FIRST ENTRY

## (undated) DEVICE — C-ARM: Brand: UNBRANDED

## (undated) DEVICE — GARMENT,MEDLINE,DVT,INT,CALF,MED, GEN2: Brand: MEDLINE

## (undated) DEVICE — REM POLYHESIVE ADULT PATIENT RETURN ELECTRODE: Brand: VALLEYLAB

## (undated) DEVICE — SOL TOP ADH MASTISOL 2/3ML VI -- NDC#00496052348

## (undated) DEVICE — APPLIER CLP M L L11.4IN DIA10MM ENDOSCP ROT MULT FOR LIG

## (undated) DEVICE — BAG SPEC REM 224ML W4XL6IN DIA10MM 1 HND GYN DISP ENDOPCH

## (undated) DEVICE — SUTURE MCRYL SZ 4-0 L27IN ABSRB UD L19MM PS-2 1/2 CIR PRIM Y426H

## (undated) DEVICE — SYSTEM EVAC SMOKE LAPARSCOPIC

## (undated) DEVICE — INSUFFLATION NEEDLE TO ESTABLISH PNEUMOPERITONEUM.: Brand: INSUFFLATION NEEDLE

## (undated) DEVICE — STRIP,CLOSURE,WOUND,MEDI-STRIP,1/2X4: Brand: MEDLINE

## (undated) DEVICE — STOPCOCK IV 3W --

## (undated) DEVICE — 4-PORT MANIFOLD: Brand: NEPTUNE 2

## (undated) DEVICE — SYR 20ML LL STRL LF --

## (undated) DEVICE — INTENDED FOR TISSUE SEPARATION, AND OTHER PROCEDURES THAT REQUIRE A SHARP SURGICAL BLADE TO PUNCTURE OR CUT.: Brand: BARD-PARKER ® CARBON RIB-BACK BLADES

## (undated) DEVICE — TROCAR: Brand: KII® SLEEVE

## (undated) DEVICE — GENERAL LAPAROSCOPY - SMH: Brand: MEDLINE INDUSTRIES, INC.

## (undated) DEVICE — TROCAR: Brand: KII® OPTICAL ACCESS SYSTEM

## (undated) DEVICE — SUTURE SZ 0 27IN 5/8 CIR UR-6  TAPER PT VIOLET ABSRB VICRYL J603H

## (undated) DEVICE — Device

## (undated) DEVICE — 3M™ TEGADERM™ TRANSPARENT FILM DRESSING FRAME STYLE, 1624W, 2-3/8 IN X 2-3/4 IN (6 CM X 7 CM), 100/CT 4CT/CASE: Brand: 3M™ TEGADERM™

## (undated) DEVICE — HYPODERMIC SAFETY NEEDLE: Brand: MAGELLAN